# Patient Record
Sex: FEMALE | Race: WHITE | Employment: FULL TIME | URBAN - METROPOLITAN AREA
[De-identification: names, ages, dates, MRNs, and addresses within clinical notes are randomized per-mention and may not be internally consistent; named-entity substitution may affect disease eponyms.]

---

## 2017-02-14 ENCOUNTER — OFFICE VISIT (OUTPATIENT)
Dept: FAMILY MEDICINE CLINIC | Age: 47
End: 2017-02-14

## 2017-02-14 VITALS
RESPIRATION RATE: 16 BRPM | WEIGHT: 266 LBS | HEART RATE: 86 BPM | TEMPERATURE: 97.8 F | HEIGHT: 69 IN | BODY MASS INDEX: 39.4 KG/M2 | SYSTOLIC BLOOD PRESSURE: 138 MMHG | DIASTOLIC BLOOD PRESSURE: 88 MMHG

## 2017-02-14 DIAGNOSIS — H81.11 BPPV (BENIGN PAROXYSMAL POSITIONAL VERTIGO), RIGHT: Primary | ICD-10-CM

## 2017-02-14 DIAGNOSIS — J30.9 ALLERGIC RHINITIS, UNSPECIFIED ALLERGIC RHINITIS TRIGGER, UNSPECIFIED RHINITIS SEASONALITY: ICD-10-CM

## 2017-02-14 PROCEDURE — 99213 OFFICE O/P EST LOW 20 MIN: CPT | Performed by: FAMILY MEDICINE

## 2017-02-14 RX ORDER — MECLIZINE HYDROCHLORIDE 25 MG/1
25 TABLET ORAL 3 TIMES DAILY PRN
Qty: 60 TABLET | Refills: 1 | Status: SHIPPED | OUTPATIENT
Start: 2017-02-14 | End: 2017-10-03 | Stop reason: SDUPTHER

## 2017-02-14 ASSESSMENT — ENCOUNTER SYMPTOMS
CHANGE IN BOWEL HABIT: 0
VOMITING: 0
VISUAL CHANGE: 0
NAUSEA: 0
ABDOMINAL PAIN: 0
EYES NEGATIVE: 1
SWOLLEN GLANDS: 0
RESPIRATORY NEGATIVE: 1
GASTROINTESTINAL NEGATIVE: 1
SORE THROAT: 0
COUGH: 0

## 2017-06-05 DIAGNOSIS — K21.9 GASTROESOPHAGEAL REFLUX DISEASE, ESOPHAGITIS PRESENCE NOT SPECIFIED: ICD-10-CM

## 2017-06-05 DIAGNOSIS — I10 ESSENTIAL HYPERTENSION: ICD-10-CM

## 2017-06-05 RX ORDER — METOPROLOL SUCCINATE 100 MG/1
TABLET, EXTENDED RELEASE ORAL
Qty: 90 TABLET | Refills: 1 | Status: SHIPPED | OUTPATIENT
Start: 2017-06-05 | End: 2017-10-03 | Stop reason: SDUPTHER

## 2017-06-05 RX ORDER — OMEPRAZOLE 40 MG/1
CAPSULE, DELAYED RELEASE ORAL
Qty: 90 CAPSULE | Refills: 1 | Status: SHIPPED | OUTPATIENT
Start: 2017-06-05 | End: 2017-10-03 | Stop reason: SDUPTHER

## 2017-10-03 ENCOUNTER — OFFICE VISIT (OUTPATIENT)
Dept: FAMILY MEDICINE CLINIC | Age: 47
End: 2017-10-03

## 2017-10-03 VITALS
RESPIRATION RATE: 16 BRPM | BODY MASS INDEX: 40.73 KG/M2 | HEART RATE: 78 BPM | WEIGHT: 275 LBS | TEMPERATURE: 98.1 F | SYSTOLIC BLOOD PRESSURE: 136 MMHG | DIASTOLIC BLOOD PRESSURE: 86 MMHG | HEIGHT: 69 IN

## 2017-10-03 DIAGNOSIS — Z12.31 ENCOUNTER FOR SCREENING MAMMOGRAM FOR BREAST CANCER: ICD-10-CM

## 2017-10-03 DIAGNOSIS — I10 ESSENTIAL HYPERTENSION: Primary | ICD-10-CM

## 2017-10-03 DIAGNOSIS — K21.9 GASTROESOPHAGEAL REFLUX DISEASE, ESOPHAGITIS PRESENCE NOT SPECIFIED: ICD-10-CM

## 2017-10-03 DIAGNOSIS — Z83.3 FAMILY HISTORY OF DIABETES MELLITUS (DM): Primary | ICD-10-CM

## 2017-10-03 DIAGNOSIS — I10 ESSENTIAL HYPERTENSION: ICD-10-CM

## 2017-10-03 DIAGNOSIS — H81.11 BPPV (BENIGN PAROXYSMAL POSITIONAL VERTIGO), RIGHT: ICD-10-CM

## 2017-10-03 DIAGNOSIS — Z83.3 FAMILY HISTORY OF DIABETES MELLITUS (DM): ICD-10-CM

## 2017-10-03 DIAGNOSIS — E28.2 PCOD (POLYCYSTIC OVARIAN DISEASE): ICD-10-CM

## 2017-10-03 DIAGNOSIS — F32.81 PMDD (PREMENSTRUAL DYSPHORIC DISORDER): ICD-10-CM

## 2017-10-03 LAB
ALBUMIN SERPL-MCNC: 4.2 G/DL (ref 3.9–4.9)
ALP BLD-CCNC: 79 U/L (ref 40–130)
ALT SERPL-CCNC: 20 U/L (ref 0–33)
ANION GAP SERPL CALCULATED.3IONS-SCNC: 17 MEQ/L (ref 7–13)
AST SERPL-CCNC: 19 U/L (ref 0–35)
BILIRUB SERPL-MCNC: 0.6 MG/DL (ref 0–1.2)
BUN BLDV-MCNC: 18 MG/DL (ref 6–20)
CALCIUM SERPL-MCNC: 9.8 MG/DL (ref 8.6–10.2)
CHLORIDE BLD-SCNC: 105 MEQ/L (ref 98–107)
CHOLESTEROL, TOTAL: 210 MG/DL (ref 0–199)
CO2: 22 MEQ/L (ref 22–29)
CREAT SERPL-MCNC: 0.71 MG/DL (ref 0.5–0.9)
GFR AFRICAN AMERICAN: >60
GFR NON-AFRICAN AMERICAN: >60
GLOBULIN: 2.9 G/DL (ref 2.3–3.5)
GLUCOSE BLD-MCNC: 96 MG/DL (ref 74–109)
HBA1C MFR BLD: 5.5 % (ref 4.8–5.9)
HCT VFR BLD CALC: 43 % (ref 37–47)
HDLC SERPL-MCNC: 57 MG/DL (ref 40–59)
HEMOGLOBIN: 14.1 G/DL (ref 12–16)
LDL CHOLESTEROL CALCULATED: 124 MG/DL (ref 0–129)
MCH RBC QN AUTO: 29.7 PG (ref 27–31.3)
MCHC RBC AUTO-ENTMCNC: 32.7 % (ref 33–37)
MCV RBC AUTO: 90.8 FL (ref 82–100)
PDW BLD-RTO: 14.7 % (ref 11.5–14.5)
PLATELET # BLD: 272 K/UL (ref 130–400)
POTASSIUM SERPL-SCNC: 4.9 MEQ/L (ref 3.5–5.1)
RBC # BLD: 4.73 M/UL (ref 4.2–5.4)
SODIUM BLD-SCNC: 144 MEQ/L (ref 132–144)
TOTAL PROTEIN: 7.1 G/DL (ref 6.4–8.1)
TRIGL SERPL-MCNC: 147 MG/DL (ref 0–200)
WBC # BLD: 9.1 K/UL (ref 4.8–10.8)

## 2017-10-03 PROCEDURE — 99214 OFFICE O/P EST MOD 30 MIN: CPT | Performed by: FAMILY MEDICINE

## 2017-10-03 RX ORDER — IBUPROFEN 800 MG/1
800 TABLET ORAL EVERY 8 HOURS PRN
Qty: 200 TABLET | Refills: 1 | Status: SHIPPED | OUTPATIENT
Start: 2017-10-03 | End: 2018-07-25 | Stop reason: SDUPTHER

## 2017-10-03 RX ORDER — METOPROLOL SUCCINATE 100 MG/1
TABLET, EXTENDED RELEASE ORAL
Qty: 90 TABLET | Refills: 3 | Status: SHIPPED | OUTPATIENT
Start: 2017-10-03 | End: 2018-09-15 | Stop reason: SDUPTHER

## 2017-10-03 RX ORDER — OMEPRAZOLE 40 MG/1
CAPSULE, DELAYED RELEASE ORAL
Qty: 90 CAPSULE | Refills: 3 | Status: SHIPPED | OUTPATIENT
Start: 2017-10-03 | End: 2018-10-15 | Stop reason: SDUPTHER

## 2017-10-03 RX ORDER — MECLIZINE HYDROCHLORIDE 25 MG/1
25 TABLET ORAL 3 TIMES DAILY PRN
Qty: 100 TABLET | Refills: 1 | Status: SHIPPED | OUTPATIENT
Start: 2017-10-03 | End: 2019-09-19 | Stop reason: ALTCHOICE

## 2017-10-03 ASSESSMENT — PATIENT HEALTH QUESTIONNAIRE - PHQ9
2. FEELING DOWN, DEPRESSED OR HOPELESS: 0
SUM OF ALL RESPONSES TO PHQ QUESTIONS 1-9: 0
SUM OF ALL RESPONSES TO PHQ9 QUESTIONS 1 & 2: 0
1. LITTLE INTEREST OR PLEASURE IN DOING THINGS: 0

## 2017-10-03 NOTE — PROGRESS NOTES
Subjective  Adri Gustafson, 48 y.o. female presents today with:  Chief Complaint   Patient presents with    Annual Exam     pt sees gyn for pap    Hypertension       Dad had colon ca dx 77    Past Medical History:   Diagnosis Date    Hypertention, malignant, with acute intensive management     PCOS (polycystic ovarian syndrome)      History reviewed. No pertinent surgical history. Social History     Social History    Marital status:      Spouse name: N/A    Number of children: N/A    Years of education: N/A     Occupational History    Not on file. Social History Main Topics    Smoking status: Never Smoker    Smokeless tobacco: Never Used    Alcohol use Not on file    Drug use: Not on file    Sexual activity: Not on file     Other Topics Concern    Not on file     Social History Narrative     Family History   Problem Relation Age of Onset    Diabetes Paternal Grandfather     Hypertension Father      No Known Allergies  Current Outpatient Prescriptions   Medication Sig Dispense Refill    sertraline (ZOLOFT) 50 MG tablet TAKE 1 TABLET DAILY 90 tablet 3    omeprazole (PRILOSEC) 40 MG delayed release capsule TAKE 1 CAPSULE DAILY 90 capsule 3    metoprolol succinate (TOPROL XL) 100 MG extended release tablet TAKE 1 TABLET DAILY 90 tablet 3    meclizine (ANTIVERT) 25 MG tablet Take 1 tablet by mouth 3 times daily as needed for Dizziness or Nausea 100 tablet 1    ibuprofen (ADVIL;MOTRIN) 800 MG tablet Take 1 tablet by mouth every 8 hours as needed for Pain 200 tablet 1    ketoconazole (NIZORAL) 2 % cream Apply topically 2 times per day 60 g 3    cetirizine (ZYRTEC ALLERGY) 10 MG tablet Take 10 mg by mouth daily      Multiple Vitamins-Minerals (CENTRUM ULTRA WOMENS) TABS Take  by mouth. No current facility-administered medications for this visit. The patient denies any history of      seizures,             heart attack or KNOWN CAD        or stroke.      No chest pain, Classification is high. Expected Values:  Males:    >55 = No Risk            35-55 = Moderate Risk            <35 = High Risk  Females:  >65 = No Risk            45-65 = Moderate Risk            <45 = High Risk  NCEP Guidelines: Third Report May 2001  >59 = negative risk factor for CHD  <40 = major risk factor for CHD      LDL Calculated 06/21/2016 120  0 - 129 mg/dL Final    ATP III LDL Classification is Near Optimal.    T4 Free 06/21/2016 1.23  0.93 - 1.70 ng/dL Final    TSH 06/21/2016 2.570  0.270 - 4.200 uIU/mL Final    Omar Grass IgE 06/21/2016 <0.10  <=0.34 kU/L Final    Comment: Performed by [facility] Steele , [address] [phone]  [email or web address] Tabor MD - Lab. Director      Tam 19 IgE 06/21/2016 <0.10  <=0.34 kU/L Final    Comment: Performed by [facility] Steele , [address] [phone]  www. MD [facility] Lab. Director      ALLERGEN, MAYS PEPPER 06/21/2016 <0.10  <=0.34 kU/L Final    Carrot 06/21/2016 <0.10  <=0.34 kU/L Final    ALLERGEN CHICKEN IGE 06/21/2016 <0.10  <=0.34 kU/L Final    Crab 06/21/2016 <0.10  <=0.34 kU/L Final    Egg White IgE 06/21/2016 0.16  <=0.34 kU/L Final    Grape 06/21/2016 <0.10  <=0.34 kU/L Final    Lettuce 06/21/2016 <0.10  <=0.34 kU/L Final    Milk, Cow's IgE 06/21/2016 <0.10  <=0.34 kU/L Final    Comment: Performed by [facility] Steele , [address] [phone]  wwwMD Ginger Kimble Lab.  Director      ALLERGEN NAVY JACQUES 06/21/2016 <0.10  <=0.34 kU/L Final    Orange 06/21/2016 <0.10  <=0.34 kU/L Final    Peanut IgE 06/21/2016 0.18  <=0.34 kU/L Final    ALLERGEN RYE IGE 06/21/2016 <0.10  <=0.34 kU/L Final    Shrimp IgE 06/21/2016 <0.10  <=0.34 kU/L Final    Soybean IgE 06/21/2016 <0.10  <=0.34 kU/L Final    Tomato IgE 06/21/2016 0.12  <=0.34 kU/L Final    Wheat IgE 06/21/2016 <0.10  <=0.34 kU/L Final    Codfish IgE 06/21/2016 <0.10 <=0.34 kU/L Final    Oat 06/21/2016 <0.10  <=0.34 kU/L Final    ALLERGEN SEE NOTE 06/21/2016 See Note   Final    Comment: REFERENCE INTERVAL: Allergen, Interpretation   Less than 0.10 kU/L. ... Donice Alley Donice Alley Class 0... Donice Alley Donice Alley No significant level detected   0.10-0.34 kU/L. ......... Donice Alley Class 0/1. Donice Alley Donice Alley Clinical relevance undetermined   0.35-0.70 kU/L. ......... Donice Alley Class 1... Donice Alley Donice Alley Low   0.71-3.50 kU/L. ......... Donice Alley Class 2. .. Donice Alley Donice Alley Moderate   3.51-17.50 kU/L. ........ Donice Alley Class 3... Donice Alley Donice Alley High   17.51-50.00 kU/L. ....... Donice Alley Class 4. .. Donice Alley Donice Alley Very High   50..00 kU/L. ...... Donice Alley Class 5. .. Donice Alley Donice Alley Very High   Greater than 100.00kU/L. Donice Alley Class 6. .. Donice Alley Donice Alley Very High  Allergen results of 0.10-0.34 kU/L are intended for specialist use as  the  clinical relevance is undetermined. Even though increasing ranges are  reflective of increasing concentrations of allergen-specific IgE, these  concentrations may not correlate with the degree of clinical response  or skin  testing results when challenged with a specific allergen. The  correlation of  allergy laboratory results with clinical history and in vivo reactivity  to  specific allergens is essential. A negative test may not rule out  clinical                             allergy or even anaphylaxis. Performed by [facility] Steele , [address] [phone]  www. Valentino Phillips MD - Lab.  Director      ALLERGEN POTATO IGE 06/21/2016 <0.10  <=0.34 kU/L Final    ALLERGEN RICE IGE 06/21/2016 <0.10  <=0.34 kU/L Final    Barley 06/21/2016 <0.10  <=0.34 kU/L Final    Allergen Tuna IgE 06/21/2016 <0.10  <=0.34 kU/L Final    Corn 06/21/2016 <0.10  <=0.34 kU/L Final    Cabbage 06/21/2016 <0.10  <=0.34 kU/L Final    Beef 06/21/2016 <0.10  <=0.34 kU/L Final    Allergen, Pork 06/21/2016 <0.10  <=0.34 kU/L Final    IgE 06/21/2016 18  <=214 kU/L Final    Comment: REFERENCE INTERVAL: Immunoglobulin E, Serum  Access complete set of age- and/or gender-specific reference intervals  for  this test in the [address] [facility] Test omeprazole (PRILOSEC) 40 MG delayed release capsule Reorder    metoprolol succinate (TOPROL XL) 100 MG extended release tablet Reorder    meclizine (ANTIVERT) 25 MG tablet Reorder    ibuprofen (ADVIL;MOTRIN) 800 MG tablet Reorder     No Follow-up on file. lmp 1 wk ago WNL  Pinky Hinton is advised to follow up ASAP if condition deteriorates or problems arise and if no information on test results to patient in the next 1 month they are advised to call us.    Mammogram ordered  Mood is good    Britney Aguilera MD

## 2017-10-03 NOTE — MR AVS SNAPSHOT
people who are more muscular. Even a small weight loss (between 5 and 10 percent of your current weight) by decreasing your calorie intake and becoming more physically active will help lower your risk of developing or worsening diseases associated with obesity. Learn more at: [employer]             Today's Medication Changes          These changes are accurate as of: 10/3/17  9:57 AM.  If you have any questions, ask your nurse or doctor. CHANGE how you take these medications           metoprolol succinate 100 MG extended release tablet   Commonly known as:  TOPROL XL   Instructions:  TAKE 1 TABLET DAILY   Quantity:  90 tablet   Refills:  3   What changed:  See the new instructions. Changed by:  Bernarda Ramirez MD       omeprazole 40 MG delayed release capsule   Commonly known as:  PRILOSEC   Instructions:  TAKE 1 CAPSULE DAILY   Quantity:  90 capsule   Refills:  3   What changed:  See the new instructions. Changed by:  Bernarda Ramirez MD       sertraline 50 MG tablet   Commonly known as:  ZOLOFT   Instructions:  TAKE 1 TABLET DAILY   Quantity:  90 tablet   Refills:  3   What changed:  See the new instructions.    Changed by:  Bernarda Ramirez MD         STOP taking these medications           albuterol sulfate  (90 Base) MCG/ACT inhaler   Commonly known as:  PROVENTIL HFA   Stopped by:  Bernarda Ramirez MD       montelukast 10 MG tablet   Commonly known as:  SINGULAIR   Stopped by:  Bernarda Ramirez MD            Where to Get Your Medications      These medications were sent to [facility], [address] [phone] [facility] [place] [phone]  [address] [address]     Phone:  [phone]     ibuprofen 800 MG tablet    meclizine 25 MG tablet    metoprolol succinate 100 MG extended release tablet    omeprazole 40 MG delayed release capsule    sertraline 50 MG tablet view the After Visit Summary. Additional Information  If you have questions, please contact the physician practice where you receive care. Remember, MyChart is NOT to be used for urgent needs. For medical emergencies, dial 911. For questions regarding your [facility] account call [phone]. If you have a clinical question, please call your doctor's office.

## 2017-12-06 ENCOUNTER — HOSPITAL ENCOUNTER (OUTPATIENT)
Dept: WOMENS IMAGING | Age: 47
Discharge: HOME | End: 2017-12-06
Payer: COMMERCIAL

## 2017-12-06 DIAGNOSIS — Z12.31 ENCOUNTER FOR SCREENING MAMMOGRAM FOR BREAST CANCER: ICD-10-CM

## 2017-12-06 PROCEDURE — 77063 BREAST TOMOSYNTHESIS BI: CPT

## 2018-07-25 DIAGNOSIS — F32.81 PMDD (PREMENSTRUAL DYSPHORIC DISORDER): ICD-10-CM

## 2018-07-25 RX ORDER — IBUPROFEN 800 MG/1
800 TABLET ORAL EVERY 8 HOURS PRN
Qty: 200 TABLET | Refills: 0 | Status: SHIPPED | OUTPATIENT
Start: 2018-07-25 | End: 2018-10-15 | Stop reason: SDUPTHER

## 2018-09-15 DIAGNOSIS — I10 ESSENTIAL HYPERTENSION: ICD-10-CM

## 2018-09-17 RX ORDER — METOPROLOL SUCCINATE 100 MG/1
TABLET, EXTENDED RELEASE ORAL
Qty: 90 TABLET | Refills: 1 | Status: SHIPPED | OUTPATIENT
Start: 2018-09-17 | End: 2018-10-15 | Stop reason: SDUPTHER

## 2018-09-22 ENCOUNTER — OFFICE VISIT (OUTPATIENT)
Dept: FAMILY MEDICINE CLINIC | Age: 48
End: 2018-09-22
Payer: COMMERCIAL

## 2018-09-22 VITALS
SYSTOLIC BLOOD PRESSURE: 126 MMHG | WEIGHT: 252 LBS | OXYGEN SATURATION: 97 % | TEMPERATURE: 97.1 F | HEART RATE: 94 BPM | DIASTOLIC BLOOD PRESSURE: 82 MMHG | HEIGHT: 69 IN | BODY MASS INDEX: 37.33 KG/M2

## 2018-09-22 DIAGNOSIS — R35.0 FREQUENCY OF URINATION: Primary | ICD-10-CM

## 2018-09-22 DIAGNOSIS — R35.0 FREQUENCY OF URINATION: ICD-10-CM

## 2018-09-22 LAB
BILIRUBIN, POC: ABNORMAL
BLOOD URINE, POC: ABNORMAL
CLARITY, POC: CLEAR
COLOR, POC: YELLOW
GLUCOSE URINE, POC: ABNORMAL
KETONES, POC: ABNORMAL
LEUKOCYTE EST, POC: ABNORMAL
NITRITE, POC: ABNORMAL
PH, POC: 5.5
PROTEIN, POC: ABNORMAL
SPECIFIC GRAVITY, POC: 1.03
UROBILINOGEN, POC: ABNORMAL

## 2018-09-22 PROCEDURE — 81003 URINALYSIS AUTO W/O SCOPE: CPT | Performed by: NURSE PRACTITIONER

## 2018-09-22 PROCEDURE — 99213 OFFICE O/P EST LOW 20 MIN: CPT | Performed by: NURSE PRACTITIONER

## 2018-09-22 RX ORDER — NITROFURANTOIN 25; 75 MG/1; MG/1
100 CAPSULE ORAL 2 TIMES DAILY
Qty: 6 CAPSULE | Refills: 0 | Status: SHIPPED | OUTPATIENT
Start: 2018-09-22 | End: 2018-09-25

## 2018-09-22 RX ORDER — PHENAZOPYRIDINE HYDROCHLORIDE 100 MG/1
100 TABLET, FILM COATED ORAL 3 TIMES DAILY PRN
Qty: 6 TABLET | Refills: 0 | Status: SHIPPED | OUTPATIENT
Start: 2018-09-22 | End: 2018-09-24

## 2018-09-22 ASSESSMENT — ENCOUNTER SYMPTOMS
EYE REDNESS: 0
ABDOMINAL DISTENTION: 0
WHEEZING: 0
SORE THROAT: 0
CONSTIPATION: 0
FACIAL SWELLING: 0
DIARRHEA: 0
SHORTNESS OF BREATH: 0
EYE DISCHARGE: 0
ABDOMINAL PAIN: 0
BACK PAIN: 0
CHEST TIGHTNESS: 0
NAUSEA: 0
SINUS PRESSURE: 0
COUGH: 0

## 2018-09-22 NOTE — PROGRESS NOTES
Subjective:      Patient ID: Winsome Zhou is a 50 y.o. female who presents today for:  Chief Complaint   Patient presents with    Urinary Tract Infection     pt states she been having uti symptoms since Monday. She is having burning when urinating and pain in her lower back. HPI    Past Medical History:   Diagnosis Date    Hypertention, malignant, with acute intensive management     PCOS (polycystic ovarian syndrome)      No past surgical history on file. Family History   Problem Relation Age of Onset    Diabetes Paternal Grandfather     Hypertension Father      Social History     Social History    Marital status:      Spouse name: N/A    Number of children: N/A    Years of education: N/A     Occupational History    Not on file. Social History Main Topics    Smoking status: Never Smoker    Smokeless tobacco: Never Used    Alcohol use Not on file    Drug use: Unknown    Sexual activity: Not on file     Other Topics Concern    Not on file     Social History Narrative    No narrative on file     Current Outpatient Prescriptions on File Prior to Visit   Medication Sig Dispense Refill    metoprolol succinate (TOPROL XL) 100 MG extended release tablet TAKE 1 TABLET DAILY 90 tablet 1    ibuprofen (ADVIL;MOTRIN) 800 MG tablet Take 1 tablet by mouth every 8 hours as needed for Pain 200 tablet 0    sertraline (ZOLOFT) 50 MG tablet TAKE 1 TABLET DAILY 90 tablet 3    omeprazole (PRILOSEC) 40 MG delayed release capsule TAKE 1 CAPSULE DAILY 90 capsule 3    meclizine (ANTIVERT) 25 MG tablet Take 1 tablet by mouth 3 times daily as needed for Dizziness or Nausea 100 tablet 1    ketoconazole (NIZORAL) 2 % cream Apply topically 2 times per day 60 g 3    cetirizine (ZYRTEC ALLERGY) 10 MG tablet Take 10 mg by mouth daily      Multiple Vitamins-Minerals (CENTRUM ULTRA WOMENS) TABS Take  by mouth. No current facility-administered medications on file prior to visit.         Allergies: Patient has no known allergies. Review of Systems   Constitutional: Negative for appetite change, chills, diaphoresis, fatigue and fever. HENT: Negative for congestion, dental problem, ear discharge, ear pain, facial swelling, mouth sores, postnasal drip, sinus pressure and sore throat. Eyes: Negative for discharge and redness. Respiratory: Negative for cough, chest tightness, shortness of breath and wheezing. Cardiovascular: Negative for chest pain, palpitations and leg swelling. Gastrointestinal: Negative for abdominal distention, abdominal pain, constipation, diarrhea and nausea. Endocrine: Negative for cold intolerance and heat intolerance. Genitourinary: Positive for dysuria and frequency. Negative for difficulty urinating, flank pain, pelvic pain and urgency. Musculoskeletal: Negative for arthralgias, back pain, gait problem and joint swelling. Skin: Negative. Neurological: Negative for dizziness, seizures, syncope, weakness, numbness and headaches. Psychiatric/Behavioral: Negative for agitation, behavioral problems, confusion and dysphoric mood. Objective:   /82   Pulse 94   Temp 97.1 °F (36.2 °C) (Tympanic)   Ht 5' 9\" (1.753 m)   Wt 252 lb (114.3 kg)   SpO2 97%   BMI 37.21 kg/m²     Physical Exam   Constitutional: She is oriented to person, place, and time. She appears well-developed. HENT:   Head: Normocephalic and atraumatic. Mouth/Throat: Oropharynx is clear and moist.   Eyes: Pupils are equal, round, and reactive to light. Neck: No tracheal deviation present. No thyromegaly present. Cardiovascular: Normal rate, regular rhythm and normal heart sounds. Exam reveals no gallop. No murmur heard. Pulmonary/Chest: Effort normal and breath sounds normal. She has no wheezes. She has no rales. She exhibits no tenderness. Abdominal: Soft. Bowel sounds are normal. She exhibits no distension and no mass. There is no tenderness.  There is no rebound and no

## 2018-09-24 LAB
ORGANISM: ABNORMAL
URINE CULTURE, ROUTINE: ABNORMAL
URINE CULTURE, ROUTINE: ABNORMAL

## 2018-09-25 ENCOUNTER — TELEPHONE (OUTPATIENT)
Dept: FAMILY MEDICINE CLINIC | Age: 48
End: 2018-09-25

## 2018-09-25 NOTE — TELEPHONE ENCOUNTER
Pt would like call as to Urine culture results when reviewed by NADEEM. States she only was given 3 days of antibiotics until culture results were in.

## 2018-09-26 RX ORDER — CEPHALEXIN 500 MG/1
500 CAPSULE ORAL 3 TIMES DAILY
Qty: 21 CAPSULE | Refills: 0 | Status: SHIPPED | OUTPATIENT
Start: 2018-09-26 | End: 2018-10-15 | Stop reason: ALTCHOICE

## 2018-10-15 ENCOUNTER — OFFICE VISIT (OUTPATIENT)
Dept: FAMILY MEDICINE CLINIC | Age: 48
End: 2018-10-15
Payer: COMMERCIAL

## 2018-10-15 VITALS
WEIGHT: 264 LBS | TEMPERATURE: 97.7 F | BODY MASS INDEX: 39.1 KG/M2 | RESPIRATION RATE: 16 BRPM | HEART RATE: 76 BPM | DIASTOLIC BLOOD PRESSURE: 82 MMHG | HEIGHT: 69 IN | SYSTOLIC BLOOD PRESSURE: 136 MMHG

## 2018-10-15 DIAGNOSIS — I10 ESSENTIAL HYPERTENSION: Primary | ICD-10-CM

## 2018-10-15 DIAGNOSIS — K21.9 GASTROESOPHAGEAL REFLUX DISEASE, ESOPHAGITIS PRESENCE NOT SPECIFIED: ICD-10-CM

## 2018-10-15 DIAGNOSIS — E28.2 PCOD (POLYCYSTIC OVARIAN DISEASE): ICD-10-CM

## 2018-10-15 DIAGNOSIS — Z23 NEED FOR INFLUENZA VACCINATION: ICD-10-CM

## 2018-10-15 DIAGNOSIS — Z12.11 COLON CANCER SCREENING: ICD-10-CM

## 2018-10-15 DIAGNOSIS — Z12.31 VISIT FOR SCREENING MAMMOGRAM: ICD-10-CM

## 2018-10-15 DIAGNOSIS — F32.81 PMDD (PREMENSTRUAL DYSPHORIC DISORDER): ICD-10-CM

## 2018-10-15 PROCEDURE — 90471 IMMUNIZATION ADMIN: CPT | Performed by: FAMILY MEDICINE

## 2018-10-15 PROCEDURE — 99214 OFFICE O/P EST MOD 30 MIN: CPT | Performed by: FAMILY MEDICINE

## 2018-10-15 PROCEDURE — 90688 IIV4 VACCINE SPLT 0.5 ML IM: CPT | Performed by: FAMILY MEDICINE

## 2018-10-15 RX ORDER — METOPROLOL SUCCINATE 100 MG/1
TABLET, EXTENDED RELEASE ORAL
Qty: 135 TABLET | Refills: 3 | Status: SHIPPED | OUTPATIENT
Start: 2018-10-15 | End: 2019-09-19 | Stop reason: SDUPTHER

## 2018-10-15 RX ORDER — OMEPRAZOLE 40 MG/1
CAPSULE, DELAYED RELEASE ORAL
Qty: 90 CAPSULE | Refills: 3 | Status: SHIPPED | OUTPATIENT
Start: 2018-10-15 | End: 2019-09-19 | Stop reason: SDUPTHER

## 2018-10-15 RX ORDER — IBUPROFEN 800 MG/1
800 TABLET ORAL EVERY 8 HOURS PRN
Qty: 200 TABLET | Refills: 3 | Status: SHIPPED | OUTPATIENT
Start: 2018-10-15 | End: 2019-09-19 | Stop reason: SDUPTHER

## 2018-10-15 ASSESSMENT — PATIENT HEALTH QUESTIONNAIRE - PHQ9
SUM OF ALL RESPONSES TO PHQ9 QUESTIONS 1 & 2: 0
2. FEELING DOWN, DEPRESSED OR HOPELESS: 0
SUM OF ALL RESPONSES TO PHQ QUESTIONS 1-9: 0
SUM OF ALL RESPONSES TO PHQ QUESTIONS 1-9: 0
1. LITTLE INTEREST OR PLEASURE IN DOING THINGS: 0

## 2018-10-15 NOTE — PROGRESS NOTES
Vaccine Information Sheet, \"Influenza - Inactivated\"  given to Blanca Sanchez, or parent/legal guardian of  Blanca Sanchez and verbalized understanding. Patient responses:    Have you ever had a reaction to a flu vaccine? No  Are you able to eat eggs without adverse effects? Yes  Do you have any current illness? No  Have you ever had Guillian [place] Syndrome? No    Flu vaccine given per order. Please see immunization tab.
09/22/18 94   10/03/17 78   02/14/17 86       PHYSICAL EXAMINATION:        GENERAL:    The patient appears well nourished and well-developed,     Normal affect. Not appearing significantly anxious or depressed. No acute respiratory distress. Alert and oriented times 3. Skin:     No skin rashes. No concerning moles observed. Gait:    Normal gait. No ataxia. HEENT:  Normocephalic, atraumatic. Throat:  Pharynx is clear, no erythema/ edema or exudates   Ears:    TMs normal bilaterally. Canals and ears normal   Eyes:  Extraocular eye motions intact and pain free. Pupils reactive/equal    Sclerae and conjunctivae clear    NECK: No masses or adenopathy palpable. No carotid bruits heard. No asymmetry visible. No thyromegaly. RESPIRATORY:   Clear/ Equal breath sounds /No acute respiratory distress. No wheezes,rales, or rhonchi. No percussive abnormalities    HEART: Regular rhythm without murmur, rub or gallop. ABDOMEN:  Obese Soft, non tender. No masses, guarding or rebound. Normo active bowel sounds. EXTREMITIES:  No edema in any extremity. No cyanosis or clubbing. 2+ dorsalis pedis pulses bilaterally          Assessment & Plan    Diagnosis Orders   1. Essential hypertension  metoprolol succinate (TOPROL XL) 100 MG extended release tablet    Lipid Panel    CBC    Comprehensive Metabolic Panel   2. PCOD (polycystic ovarian disease)     3. PMDD (premenstrual dysphoric disorder)  ibuprofen (ADVIL;MOTRIN) 800 MG tablet    sertraline (ZOLOFT) 50 MG tablet   4. Gastroesophageal reflux disease, esophagitis presence not specified  omeprazole (PRILOSEC) 40 MG delayed release capsule   5. Need for influenza vaccination  INFLUENZA, QUADV, 3 YRS AND OLDER, IM, MDV, 0.5ML ([facility])   6. Colon cancer screening  [facility]ia Otoniel Hussein MD - [facility]   7.  Visit for screening mammogram  [facility] DIGITAL SCREEN W OR WO CAD BILATERAL     Orders Placed

## 2018-11-02 ENCOUNTER — OFFICE VISIT (OUTPATIENT)
Dept: OBGYN CLINIC | Age: 48
End: 2018-11-02
Payer: COMMERCIAL

## 2018-11-02 VITALS
BODY MASS INDEX: 38.36 KG/M2 | WEIGHT: 259 LBS | HEIGHT: 69 IN | SYSTOLIC BLOOD PRESSURE: 132 MMHG | DIASTOLIC BLOOD PRESSURE: 82 MMHG

## 2018-11-02 DIAGNOSIS — I10 ESSENTIAL HYPERTENSION: ICD-10-CM

## 2018-11-02 DIAGNOSIS — Z11.51 SCREENING FOR HUMAN PAPILLOMAVIRUS: ICD-10-CM

## 2018-11-02 DIAGNOSIS — Z01.419 PAP SMEAR, AS PART OF ROUTINE GYNECOLOGICAL EXAMINATION: Primary | ICD-10-CM

## 2018-11-02 DIAGNOSIS — N92.6 IRREGULAR MENSES: ICD-10-CM

## 2018-11-02 LAB
ALBUMIN SERPL-MCNC: 4.4 G/DL (ref 3.9–4.9)
ALP BLD-CCNC: 79 U/L (ref 40–130)
ALT SERPL-CCNC: 16 U/L (ref 0–33)
ANION GAP SERPL CALCULATED.3IONS-SCNC: 19 MEQ/L (ref 7–13)
AST SERPL-CCNC: 20 U/L (ref 0–35)
BILIRUB SERPL-MCNC: 0.8 MG/DL (ref 0–1.2)
BUN BLDV-MCNC: 12 MG/DL (ref 6–20)
CALCIUM SERPL-MCNC: 9.5 MG/DL (ref 8.6–10.2)
CHLORIDE BLD-SCNC: 98 MEQ/L (ref 98–107)
CHOLESTEROL, TOTAL: 202 MG/DL (ref 0–199)
CO2: 21 MEQ/L (ref 22–29)
CREAT SERPL-MCNC: 0.91 MG/DL (ref 0.5–0.9)
GFR AFRICAN AMERICAN: >60
GFR NON-AFRICAN AMERICAN: >60
GLOBULIN: 3 G/DL (ref 2.3–3.5)
GLUCOSE BLD-MCNC: 89 MG/DL (ref 74–109)
HCT VFR BLD CALC: 44.4 % (ref 37–47)
HDLC SERPL-MCNC: 57 MG/DL (ref 40–59)
HEMOGLOBIN: 14.9 G/DL (ref 12–16)
LDL CHOLESTEROL CALCULATED: 115 MG/DL (ref 0–129)
MCH RBC QN AUTO: 30.4 PG (ref 27–31.3)
MCHC RBC AUTO-ENTMCNC: 33.6 % (ref 33–37)
MCV RBC AUTO: 90.5 FL (ref 82–100)
PDW BLD-RTO: 14.6 % (ref 11.5–14.5)
PLATELET # BLD: 278 K/UL (ref 130–400)
POTASSIUM SERPL-SCNC: 4.5 MEQ/L (ref 3.5–5.1)
RBC # BLD: 4.9 M/UL (ref 4.2–5.4)
SODIUM BLD-SCNC: 138 MEQ/L (ref 132–144)
TOTAL PROTEIN: 7.4 G/DL (ref 6.4–8.1)
TRIGL SERPL-MCNC: 148 MG/DL (ref 0–200)
WBC # BLD: 8.3 K/UL (ref 4.8–10.8)

## 2018-11-02 PROCEDURE — 99386 PREV VISIT NEW AGE 40-64: CPT | Performed by: OBSTETRICS & GYNECOLOGY

## 2018-11-02 ASSESSMENT — ENCOUNTER SYMPTOMS
ABDOMINAL DISTENTION: 0
VOMITING: 0
DIARRHEA: 0
CONSTIPATION: 0
RECTAL PAIN: 0
ABDOMINAL PAIN: 0
NAUSEA: 0
ALLERGIC/IMMUNOLOGIC NEGATIVE: 1
ANAL BLEEDING: 0
BLOOD IN STOOL: 0
EYES NEGATIVE: 1
RESPIRATORY NEGATIVE: 1

## 2018-11-02 NOTE — PROGRESS NOTES
Number of children: N/A    Years of education: N/A     Occupational History    Not on file. Social History Main Topics    Smoking status: Never Smoker    Smokeless tobacco: Never Used    Alcohol use Yes      Comment: occ    Drug use: No    Sexual activity: Not Currently     Other Topics Concern    Not on file     Social History Narrative    No narrative on file     Family History   Problem Relation Age of Onset    Diabetes Paternal Grandfather     Hypertension Father     Cancer Father         colon    Cancer Brother         kidney    No Known Problems Paternal Grandmother     No Known Problems Maternal Grandmother     No Known Problems Maternal Grandfather     No Known Problems Mother     No Known Problems Sister     No Known Problems Other     Breast Cancer Neg Hx     Colon Cancer Neg Hx     Eclampsia Neg Hx     Ovarian Cancer Neg Hx      Labor Neg Hx     Spont Abortions Neg Hx     Stroke Neg Hx        Review of Systems   Constitutional: Negative. Negative for activity change, appetite change, chills, diaphoresis, fatigue, fever and unexpected weight change. HENT: Negative. Eyes: Negative. Respiratory: Negative. Cardiovascular: Negative. Gastrointestinal: Negative for abdominal distention, abdominal pain, anal bleeding, blood in stool, constipation, diarrhea, nausea, rectal pain and vomiting. Endocrine: Negative. Genitourinary: Positive for menstrual problem (Irregular cycles). Negative for decreased urine volume, difficulty urinating, dyspareunia, dysuria, enuresis, flank pain, frequency, genital sores, hematuria, pelvic pain, urgency, vaginal bleeding, vaginal discharge and vaginal pain. Musculoskeletal: Negative. Skin: Negative. Allergic/Immunologic: Negative. Neurological: Negative. Hematological: Negative. Psychiatric/Behavioral: Negative. Objective:     Physical Exam   Constitutional: She is oriented to person, place, and time. Plan:      Medications placedthis encounter:  No orders of the defined types were placed in this encounter. Orders placedthis encounter:  Orders Placed This Encounter   Procedures    PAP SMEAR     Patient History:    Patient's last menstrual period was 10/16/2018. OBGYN Status: Having periods  No past surgical history on file. Smoking status: Never Smoker                                                              Smokeless tobacco: Never Used                           Standing Status:   Future     Standing Expiration Date:   11/2/2019     Order Specific Question:   Collection Type     Answer: Thin Prep     Order Specific Question:   Prior Abnormal Pap Test     Answer:   No     Order Specific Question:   Screening or Diagnostic     Answer:   Screening     Order Specific Question:   HPV Requested? Answer:   HPV Typing with HPV 16/18     Order Specific Question:   High Risk Patient     Answer:   N/A         Follow up:   Annual exam.

## 2018-11-08 DIAGNOSIS — Z11.51 SCREENING FOR HUMAN PAPILLOMAVIRUS: ICD-10-CM

## 2018-11-08 DIAGNOSIS — Z01.419 PAP SMEAR, AS PART OF ROUTINE GYNECOLOGICAL EXAMINATION: ICD-10-CM

## 2019-04-24 ENCOUNTER — TELEPHONE (OUTPATIENT)
Dept: INTERNAL MEDICINE CLINIC | Age: 49
End: 2019-04-24

## 2019-08-21 ENCOUNTER — OFFICE VISIT (OUTPATIENT)
Dept: OBGYN CLINIC | Age: 49
End: 2019-08-21
Payer: COMMERCIAL

## 2019-08-21 VITALS
DIASTOLIC BLOOD PRESSURE: 82 MMHG | WEIGHT: 267 LBS | SYSTOLIC BLOOD PRESSURE: 126 MMHG | BODY MASS INDEX: 39.55 KG/M2 | HEIGHT: 69 IN

## 2019-08-21 DIAGNOSIS — N89.8 VAGINAL DISCHARGE: Primary | ICD-10-CM

## 2019-08-21 DIAGNOSIS — R10.32 LLQ ABDOMINAL PAIN: ICD-10-CM

## 2019-08-21 DIAGNOSIS — Z12.31 SCREENING MAMMOGRAM, ENCOUNTER FOR: ICD-10-CM

## 2019-08-21 LAB
BILIRUBIN, POC: NORMAL
BLOOD URINE, POC: NORMAL
CLARITY, POC: NORMAL
COLOR, POC: NORMAL
GLUCOSE URINE, POC: NORMAL
KETONES, POC: NORMAL
LEUKOCYTE EST, POC: NORMAL
NITRITE, POC: NORMAL
PH, POC: 6
PROTEIN, POC: NORMAL
SPECIFIC GRAVITY, POC: 1.03
UROBILINOGEN, POC: NORMAL

## 2019-08-21 PROCEDURE — 99213 OFFICE O/P EST LOW 20 MIN: CPT | Performed by: OBSTETRICS & GYNECOLOGY

## 2019-08-21 PROCEDURE — 81002 URINALYSIS NONAUTO W/O SCOPE: CPT | Performed by: OBSTETRICS & GYNECOLOGY

## 2019-08-21 ASSESSMENT — ENCOUNTER SYMPTOMS
BLOOD IN STOOL: 0
ANAL BLEEDING: 0
RESPIRATORY NEGATIVE: 1
ABDOMINAL PAIN: 0
DIARRHEA: 0
ABDOMINAL DISTENTION: 0
CONSTIPATION: 0
RECTAL PAIN: 0
EYES NEGATIVE: 1
VOMITING: 0
ALLERGIC/IMMUNOLOGIC NEGATIVE: 1
NAUSEA: 0

## 2019-08-21 NOTE — PROGRESS NOTES
Patient here c/o vaginal discharge and LLQ pain x 2 weeks. States discharge is more copious, but does not itch, burn or smell foul. No new sexual partner. Cycles q 21-22 days lasting 7 days. No IM or PC bleeding. States she did start swimming in last month. Denies soap or detergent change. Denies dysuria, urgency or frequency,  Denies diarrhea or constipation. SSE with normal appearing discharge; cx done for sx. Ordered pelvic US. F/U results. Vitals:  /82   Ht 5' 9\" (1.753 m)   Wt 267 lb (121.1 kg)   LMP 08/04/2019   BMI 39.43 kg/m²   Past Medical History:   Diagnosis Date    Hypertention, malignant, with acute intensive management     PCOS (polycystic ovarian syndrome)      Past Surgical History:   Procedure Laterality Date    DILATION AND CURETTAGE      HYSTEROSCOPY       Allergies:  Patient has no known allergies. Current Outpatient Medications   Medication Sig Dispense Refill    metoprolol succinate (TOPROL XL) 100 MG extended release tablet TAKE 1 1/2 TABLET DAILY 135 tablet 3    ibuprofen (ADVIL;MOTRIN) 800 MG tablet Take 1 tablet by mouth every 8 hours as needed for Pain 200 tablet 3    sertraline (ZOLOFT) 50 MG tablet TAKE 1 TABLET DAILY 90 tablet 3    omeprazole (PRILOSEC) 40 MG delayed release capsule TAKE 1 CAPSULE DAILY 90 capsule 3    meclizine (ANTIVERT) 25 MG tablet Take 1 tablet by mouth 3 times daily as needed for Dizziness or Nausea 100 tablet 1    ketoconazole (NIZORAL) 2 % cream Apply topically 2 times per day 60 g 3    cetirizine (ZYRTEC ALLERGY) 10 MG tablet Take 10 mg by mouth daily      Multiple Vitamins-Minerals (CENTRUM ULTRA WOMENS) TABS Take  by mouth. No current facility-administered medications for this visit.       Social History     Socioeconomic History    Marital status:      Spouse name: Not on file    Number of children: Not on file    Years of education: Not on file    Highest education level: Not on file   Occupational affect. Her behavior is normal. Judgment and thought content normal.       Assessment:          Diagnosis Orders   1. Vaginal discharge  Miscellaneous sendout 3   2. LLQ abdominal pain  Miscellaneous sendout 3    US Pelvis Complete    US Non OB Transvaginal   3. Screening mammogram, encounter for  [facility] DIGITAL SCREEN W CAD BILATERAL        Plan:      Medications placedthis encounter:  No orders of the defined types were placed in this encounter. Orders placedthis encounter:  Orders Placed This Encounter   Procedures    US Pelvis Complete     Standing Status:   Future     Standing Expiration Date:   8/20/2020    US Non OB Transvaginal     Standing Status:   Future     Standing Expiration Date:   8/21/2020    BLAYNE DIGITAL SCREEN W CAD BILATERAL     Standing Status:   Future     Standing Expiration Date:   8/20/2020    Miscellaneous sendout 3     Standing Status:   Future     Standing Expiration Date:   8/20/2020     Order Specific Question:   Specify Req. Test (1 Test/Order)     Answer:   culture         Follow up:  Return for Ultrasound, Results Review.

## 2019-08-23 ENCOUNTER — HOSPITAL ENCOUNTER (OUTPATIENT)
Dept: ULTRASOUND IMAGING | Age: 49
Discharge: HOME | End: 2019-08-25
Payer: COMMERCIAL

## 2019-08-23 DIAGNOSIS — R10.32 LLQ ABDOMINAL PAIN: ICD-10-CM

## 2019-08-23 PROCEDURE — 76830 TRANSVAGINAL US NON-OB: CPT

## 2019-08-23 PROCEDURE — 76856 US EXAM PELVIC COMPLETE: CPT

## 2019-08-28 DIAGNOSIS — R10.32 LLQ ABDOMINAL PAIN: ICD-10-CM

## 2019-08-28 DIAGNOSIS — N89.8 VAGINAL DISCHARGE: ICD-10-CM

## 2019-08-29 ENCOUNTER — TELEPHONE (OUTPATIENT)
Dept: OBGYN CLINIC | Age: 49
End: 2019-08-29

## 2019-08-30 RX ORDER — TINIDAZOLE 500 MG/1
2 TABLET ORAL ONCE
Qty: 4 TABLET | Refills: 0 | Status: SHIPPED | OUTPATIENT
Start: 2019-08-30 | End: 2019-08-30

## 2019-09-10 ENCOUNTER — OFFICE VISIT (OUTPATIENT)
Dept: OBGYN CLINIC | Age: 49
End: 2019-09-10
Payer: COMMERCIAL

## 2019-09-10 VITALS
BODY MASS INDEX: 38.95 KG/M2 | SYSTOLIC BLOOD PRESSURE: 134 MMHG | HEIGHT: 69 IN | DIASTOLIC BLOOD PRESSURE: 88 MMHG | WEIGHT: 263 LBS

## 2019-09-10 DIAGNOSIS — R10.32 LLQ ABDOMINAL PAIN: Primary | ICD-10-CM

## 2019-09-10 DIAGNOSIS — N83.291 COMPLEX CYST OF RIGHT OVARY: ICD-10-CM

## 2019-09-10 DIAGNOSIS — R93.89 ENDOMETRIAL THICKENING ON ULTRASOUND: ICD-10-CM

## 2019-09-10 LAB — CA 125: 91.1 U/ML (ref 0–35)

## 2019-09-10 PROCEDURE — 99213 OFFICE O/P EST LOW 20 MIN: CPT | Performed by: OBSTETRICS & GYNECOLOGY

## 2019-09-10 ASSESSMENT — ENCOUNTER SYMPTOMS
NAUSEA: 0
CONSTIPATION: 0
ALLERGIC/IMMUNOLOGIC NEGATIVE: 1
RESPIRATORY NEGATIVE: 1
ANAL BLEEDING: 0
ABDOMINAL PAIN: 0
EYES NEGATIVE: 1
ABDOMINAL DISTENTION: 0
VOMITING: 0
DIARRHEA: 0
RECTAL PAIN: 0
BLOOD IN STOOL: 0

## 2019-09-10 NOTE — PROGRESS NOTES
Patient here to discuss US result ordered for LLQ pain. US with 10 cm uterus with thickened endometrium of 19mm. RIGHT complex 4 cm ovarian cyst.  Ordered  and repeat US in 3 months. LEFT ovary WNL. Patient did have BV and was treated. If LLQ persists recommended F/U for routine colonoscopy and appt with PCP to further evaluate. Previous urine negative. Will return for EMB and also F/U US in 3 months. All questions answered. F/U as directed. Pt was seen with total face to face time of 15 minutes with more than 50% of the visit being counseling and education regarding encounter dx of ovarian cyst and thickened endometrium. See discussion /counseling details as stated above. Vitals:  /88   Ht 5' 9\" (1.753 m)   Wt 263 lb (119.3 kg)   LMP 08/04/2019   BMI 38.84 kg/m²   Past Medical History:   Diagnosis Date    Hypertention, malignant, with acute intensive management     PCOS (polycystic ovarian syndrome)      Past Surgical History:   Procedure Laterality Date    DILATION AND CURETTAGE      HYSTEROSCOPY       Allergies:  Patient has no known allergies. Current Outpatient Medications   Medication Sig Dispense Refill    metoprolol succinate (TOPROL XL) 100 MG extended release tablet TAKE 1 1/2 TABLET DAILY 135 tablet 3    ibuprofen (ADVIL;MOTRIN) 800 MG tablet Take 1 tablet by mouth every 8 hours as needed for Pain 200 tablet 3    sertraline (ZOLOFT) 50 MG tablet TAKE 1 TABLET DAILY 90 tablet 3    omeprazole (PRILOSEC) 40 MG delayed release capsule TAKE 1 CAPSULE DAILY 90 capsule 3    meclizine (ANTIVERT) 25 MG tablet Take 1 tablet by mouth 3 times daily as needed for Dizziness or Nausea 100 tablet 1    ketoconazole (NIZORAL) 2 % cream Apply topically 2 times per day 60 g 3    cetirizine (ZYRTEC ALLERGY) 10 MG tablet Take 10 mg by mouth daily      Multiple Vitamins-Minerals (CENTRUM ULTRA WOMENS) TABS Take  by mouth.          No current facility-administered medications for this visit.       Social History     Socioeconomic History    Marital status:      Spouse name: Not on file    Number of children: Not on file    Years of education: Not on file    Highest education level: Not on file   Occupational History    Not on file   Social Needs    Financial resource strain: Not on file    Food insecurity:     Worry: Not on file     Inability: Not on file    Transportation needs:     Medical: Not on file     Non-medical: Not on file   Tobacco Use    Smoking status: Never Smoker    Smokeless tobacco: Never Used   Substance and Sexual Activity    Alcohol use: Yes     Comment: occ    Drug use: No    Sexual activity: Not Currently   Lifestyle    Physical activity:     Days per week: Not on file     Minutes per session: Not on file    Stress: Not on file   Relationships    Social connections:     Talks on phone: Not on file     Gets together: Not on file     Attends Jainism service: Not on file     Active member of club or organization: Not on file     Attends meetings of clubs or organizations: Not on file     Relationship status: Not on file    Intimate partner violence:     Fear of current or ex partner: Not on file     Emotionally abused: Not on file     Physically abused: Not on file     Forced sexual activity: Not on file   Other Topics Concern    Not on file   Social History Narrative    Not on file        Family History   Problem Relation Age of Onset    Diabetes Paternal Grandfather     Hypertension Father     Cancer Father         colon    Cancer Brother         kidney    No Known Problems Paternal Grandmother     No Known Problems Maternal Grandmother     No Known Problems Maternal Grandfather     No Known Problems Mother     No Known Problems Sister     No Known Problems Other     Breast Cancer Neg Hx     Colon Cancer Neg Hx     Eclampsia Neg Hx     Ovarian Cancer Neg Hx      Labor Neg Hx     Spont Abortions Neg Hx     Stroke Neg Hx

## 2019-09-13 ENCOUNTER — TELEPHONE (OUTPATIENT)
Dept: OBGYN CLINIC | Age: 49
End: 2019-09-13

## 2019-09-19 ENCOUNTER — OFFICE VISIT (OUTPATIENT)
Dept: FAMILY MEDICINE CLINIC | Age: 49
End: 2019-09-19
Payer: COMMERCIAL

## 2019-09-19 VITALS
WEIGHT: 258.8 LBS | SYSTOLIC BLOOD PRESSURE: 124 MMHG | TEMPERATURE: 98.2 F | OXYGEN SATURATION: 98 % | BODY MASS INDEX: 38.33 KG/M2 | DIASTOLIC BLOOD PRESSURE: 94 MMHG | HEART RATE: 79 BPM | HEIGHT: 69 IN

## 2019-09-19 DIAGNOSIS — F32.81 PMDD (PREMENSTRUAL DYSPHORIC DISORDER): ICD-10-CM

## 2019-09-19 DIAGNOSIS — F32.A DEPRESSION, UNSPECIFIED DEPRESSION TYPE: ICD-10-CM

## 2019-09-19 DIAGNOSIS — K21.9 GASTROESOPHAGEAL REFLUX DISEASE, ESOPHAGITIS PRESENCE NOT SPECIFIED: ICD-10-CM

## 2019-09-19 DIAGNOSIS — Z23 NEED FOR INFLUENZA VACCINATION: ICD-10-CM

## 2019-09-19 DIAGNOSIS — Z13.220 LIPID SCREENING: ICD-10-CM

## 2019-09-19 DIAGNOSIS — Z12.11 COLON CANCER SCREENING: ICD-10-CM

## 2019-09-19 DIAGNOSIS — R19.7 DIARRHEA, UNSPECIFIED TYPE: ICD-10-CM

## 2019-09-19 DIAGNOSIS — B35.4 TINEA CORPORIS: ICD-10-CM

## 2019-09-19 DIAGNOSIS — R10.32 LLQ ABDOMINAL PAIN: Primary | ICD-10-CM

## 2019-09-19 DIAGNOSIS — R10.814 LEFT LOWER QUADRANT ABDOMINAL TENDERNESS WITHOUT REBOUND TENDERNESS: ICD-10-CM

## 2019-09-19 DIAGNOSIS — E28.2 PCOS (POLYCYSTIC OVARIAN SYNDROME): ICD-10-CM

## 2019-09-19 DIAGNOSIS — I10 ESSENTIAL HYPERTENSION: ICD-10-CM

## 2019-09-19 LAB
ALBUMIN SERPL-MCNC: 4.4 G/DL (ref 3.5–4.6)
ALP BLD-CCNC: 70 U/L (ref 40–130)
ALT SERPL-CCNC: 17 U/L (ref 0–33)
ANION GAP SERPL CALCULATED.3IONS-SCNC: 13 MEQ/L (ref 9–15)
AST SERPL-CCNC: 19 U/L (ref 0–35)
BASOPHILS ABSOLUTE: 0 K/UL (ref 0–0.2)
BASOPHILS RELATIVE PERCENT: 0.4 %
BILIRUB SERPL-MCNC: 0.7 MG/DL (ref 0.2–0.7)
BUN BLDV-MCNC: 14 MG/DL (ref 6–20)
CALCIUM SERPL-MCNC: 9.7 MG/DL (ref 8.5–9.9)
CHLORIDE BLD-SCNC: 105 MEQ/L (ref 95–107)
CHOLESTEROL, TOTAL: 180 MG/DL (ref 0–199)
CO2: 22 MEQ/L (ref 20–31)
CREAT SERPL-MCNC: 0.78 MG/DL (ref 0.5–0.9)
EOSINOPHILS ABSOLUTE: 0.1 K/UL (ref 0–0.7)
EOSINOPHILS RELATIVE PERCENT: 1 %
GFR AFRICAN AMERICAN: >60
GFR NON-AFRICAN AMERICAN: >60
GLOBULIN: 3.2 G/DL (ref 2.3–3.5)
GLUCOSE BLD-MCNC: 98 MG/DL (ref 70–99)
HCT VFR BLD CALC: 44.4 % (ref 37–47)
HDLC SERPL-MCNC: 50 MG/DL (ref 40–59)
HEMOGLOBIN: 14.8 G/DL (ref 12–16)
LDL CHOLESTEROL CALCULATED: 105 MG/DL (ref 0–129)
LYMPHOCYTES ABSOLUTE: 2.2 K/UL (ref 1–4.8)
LYMPHOCYTES RELATIVE PERCENT: 30.7 %
MCH RBC QN AUTO: 30.4 PG (ref 27–31.3)
MCHC RBC AUTO-ENTMCNC: 33.3 % (ref 33–37)
MCV RBC AUTO: 91.5 FL (ref 82–100)
MONOCYTES ABSOLUTE: 0.6 K/UL (ref 0.2–0.8)
MONOCYTES RELATIVE PERCENT: 9 %
NEUTROPHILS ABSOLUTE: 4.2 K/UL (ref 1.4–6.5)
NEUTROPHILS RELATIVE PERCENT: 58.9 %
PDW BLD-RTO: 14.8 % (ref 11.5–14.5)
PLATELET # BLD: 260 K/UL (ref 130–400)
POTASSIUM SERPL-SCNC: 4.8 MEQ/L (ref 3.4–4.9)
RBC # BLD: 4.85 M/UL (ref 4.2–5.4)
SODIUM BLD-SCNC: 140 MEQ/L (ref 135–144)
TOTAL PROTEIN: 7.6 G/DL (ref 6.3–8)
TRIGL SERPL-MCNC: 126 MG/DL (ref 0–150)
WBC # BLD: 7.1 K/UL (ref 4.8–10.8)

## 2019-09-19 PROCEDURE — 90688 IIV4 VACCINE SPLT 0.5 ML IM: CPT | Performed by: NURSE PRACTITIONER

## 2019-09-19 PROCEDURE — 90471 IMMUNIZATION ADMIN: CPT | Performed by: NURSE PRACTITIONER

## 2019-09-19 PROCEDURE — 99214 OFFICE O/P EST MOD 30 MIN: CPT | Performed by: NURSE PRACTITIONER

## 2019-09-19 RX ORDER — OMEPRAZOLE 40 MG/1
CAPSULE, DELAYED RELEASE ORAL
Qty: 90 CAPSULE | Refills: 3 | Status: SHIPPED | OUTPATIENT
Start: 2019-09-19 | End: 2020-10-29 | Stop reason: SDUPTHER

## 2019-09-19 RX ORDER — IBUPROFEN 800 MG/1
800 TABLET ORAL EVERY 8 HOURS PRN
Qty: 200 TABLET | Refills: 3 | Status: SHIPPED | OUTPATIENT
Start: 2019-09-19 | End: 2020-10-29 | Stop reason: SDUPTHER

## 2019-09-19 RX ORDER — METOPROLOL SUCCINATE 100 MG/1
TABLET, EXTENDED RELEASE ORAL
Qty: 135 TABLET | Refills: 3 | Status: SHIPPED | OUTPATIENT
Start: 2019-09-19 | End: 2020-10-29 | Stop reason: SDUPTHER

## 2019-09-19 RX ORDER — KETOCONAZOLE 20 MG/G
CREAM TOPICAL
Qty: 60 G | Refills: 3 | Status: SHIPPED | OUTPATIENT
Start: 2019-09-19 | End: 2020-10-29 | Stop reason: ALTCHOICE

## 2019-09-19 ASSESSMENT — ENCOUNTER SYMPTOMS
COUGH: 0
DIARRHEA: 1
SHORTNESS OF BREATH: 0
HEMATOCHEZIA: 0
NAUSEA: 1
ABDOMINAL PAIN: 1
VOMITING: 0
CONSTIPATION: 0

## 2019-09-19 ASSESSMENT — PATIENT HEALTH QUESTIONNAIRE - PHQ9
SUM OF ALL RESPONSES TO PHQ QUESTIONS 1-9: 0
1. LITTLE INTEREST OR PLEASURE IN DOING THINGS: 0
SUM OF ALL RESPONSES TO PHQ9 QUESTIONS 1 & 2: 0
SUM OF ALL RESPONSES TO PHQ QUESTIONS 1-9: 0
2. FEELING DOWN, DEPRESSED OR HOPELESS: 0

## 2019-09-19 NOTE — PROGRESS NOTES
Subjective  Chief Complaint   Patient presents with   Manjit Aguilera Establish Care    Abdominal Pain     pt having lower LT side pain that started 3 weeks ago. states that it feels swollen and has diarrhea. states that the pain, diarrhea, and swelling are all mild. states that her temp has been a little elevated but not a fever. has not noticed any blood. Hypertension   This is a chronic problem. The current episode started more than 1 year ago. The problem is unchanged. The problem is controlled. Pertinent negatives include no chest pain, headaches, malaise/fatigue, palpitations, peripheral edema or shortness of breath. There are no associated agents to hypertension. Risk factors for coronary artery disease include obesity. Past treatments include beta blockers. The current treatment provides significant improvement. There are no compliance problems. There is no history of kidney disease, heart failure or PVD. Abdominal Pain   This is a new problem. The current episode started 1 to 4 weeks ago (3 weeks). The onset quality is gradual. The problem occurs constantly. The problem has been gradually worsening. The pain is located in the LLQ. The pain is mild. The quality of the pain is aching and dull. The abdominal pain radiates to the back. Associated symptoms include diarrhea (once daily, sometimes only 2-3 times per week) and nausea (every day). Pertinent negatives include no anorexia, arthralgias, constipation, fever, headaches, hematochezia, melena or vomiting. Nothing aggravates the pain. The pain is relieved by nothing. She has tried nothing for the symptoms. Prior diagnostic workup includes ultrasound (pelvic US, UA, treated with flagyl for BV). Pt here to establish care. Takes zoloft PRN. Has been taking it PRN for at least 3-4 years. Feels it does work well for her that way, but sometimes she does notice that it makes her a little more edgy. It does prevent \"her emotions from bubbling up\".      Gets 800 MG tablet     Sig: Take 1 tablet by mouth every 8 hours as needed for Pain     Dispense:  200 tablet     Refill:  3    omeprazole (PRILOSEC) 40 MG delayed release capsule     Sig: TAKE 1 CAPSULE DAILY     Dispense:  90 capsule     Refill:  3    metoprolol succinate (TOPROL XL) 100 MG extended release tablet     Sig: TAKE 1 1/2 TABLET DAILY     Dispense:  135 tablet     Refill:  3       Return in about 6 months (around 3/19/2020) for general check up, htn.    Theo Milligan, APRN - CNP

## 2019-09-20 ENCOUNTER — HOSPITAL ENCOUNTER (OUTPATIENT)
Dept: CT IMAGING | Age: 49
Discharge: HOME | End: 2019-09-22
Payer: COMMERCIAL

## 2019-09-20 VITALS — WEIGHT: 252 LBS | HEIGHT: 69 IN | BODY MASS INDEX: 37.33 KG/M2

## 2019-09-20 DIAGNOSIS — R10.32 LLQ ABDOMINAL PAIN: ICD-10-CM

## 2019-09-20 DIAGNOSIS — R19.7 DIARRHEA, UNSPECIFIED TYPE: ICD-10-CM

## 2019-09-20 DIAGNOSIS — R10.814 LEFT LOWER QUADRANT ABDOMINAL TENDERNESS WITHOUT REBOUND TENDERNESS: ICD-10-CM

## 2019-09-20 PROCEDURE — 6360000004 HC RX CONTRAST MEDICATION: Performed by: NURSE PRACTITIONER

## 2019-09-20 PROCEDURE — 2500000003 HC RX 250 WO HCPCS: Performed by: NURSE PRACTITIONER

## 2019-09-20 PROCEDURE — 74177 CT ABD & PELVIS W/CONTRAST: CPT

## 2019-09-20 RX ADMIN — BARIUM SULFATE 450 ML: 20 SUSPENSION ORAL at 15:53

## 2019-09-20 RX ADMIN — IOPAMIDOL 100 ML: 612 INJECTION, SOLUTION INTRAVENOUS at 15:53

## 2019-09-24 ENCOUNTER — PROCEDURE VISIT (OUTPATIENT)
Dept: OBGYN CLINIC | Age: 49
End: 2019-09-24
Payer: COMMERCIAL

## 2019-09-24 VITALS — BODY MASS INDEX: 38.1 KG/M2 | DIASTOLIC BLOOD PRESSURE: 84 MMHG | WEIGHT: 258 LBS | SYSTOLIC BLOOD PRESSURE: 128 MMHG

## 2019-09-24 DIAGNOSIS — R93.89 THICKENED ENDOMETRIUM: Primary | ICD-10-CM

## 2019-09-24 DIAGNOSIS — N83.201 CYST OF RIGHT OVARY: ICD-10-CM

## 2019-09-24 DIAGNOSIS — R97.1 ELEVATED CA-125: ICD-10-CM

## 2019-09-24 LAB
HCG, URINE, POC: NEGATIVE
Lab: NORMAL
NEGATIVE QC PASS/FAIL: NORMAL
POSITIVE QC PASS/FAIL: NORMAL

## 2019-09-24 PROCEDURE — 58100 BIOPSY OF UTERUS LINING: CPT | Performed by: OBSTETRICS & GYNECOLOGY

## 2019-09-24 PROCEDURE — 99213 OFFICE O/P EST LOW 20 MIN: CPT | Performed by: OBSTETRICS & GYNECOLOGY

## 2019-09-24 PROCEDURE — 81025 URINE PREGNANCY TEST: CPT | Performed by: OBSTETRICS & GYNECOLOGY

## 2019-09-24 ASSESSMENT — ENCOUNTER SYMPTOMS
CONSTIPATION: 0
EYES NEGATIVE: 1
VOMITING: 0
NAUSEA: 0
RECTAL PAIN: 0
ABDOMINAL DISTENTION: 0
ALLERGIC/IMMUNOLOGIC NEGATIVE: 1
ANAL BLEEDING: 0
BLOOD IN STOOL: 0
RESPIRATORY NEGATIVE: 1
ABDOMINAL PAIN: 0
DIARRHEA: 0

## 2019-09-25 ENCOUNTER — OFFICE VISIT (OUTPATIENT)
Dept: GASTROENTEROLOGY | Age: 49
End: 2019-09-25
Payer: COMMERCIAL

## 2019-09-25 VITALS
HEART RATE: 62 BPM | SYSTOLIC BLOOD PRESSURE: 130 MMHG | WEIGHT: 261 LBS | OXYGEN SATURATION: 97 % | HEIGHT: 69 IN | DIASTOLIC BLOOD PRESSURE: 72 MMHG | BODY MASS INDEX: 38.66 KG/M2 | TEMPERATURE: 97.6 F

## 2019-09-25 DIAGNOSIS — Z80.0 FAMILY HISTORY OF COLON CANCER IN FATHER: ICD-10-CM

## 2019-09-25 DIAGNOSIS — R19.7 DIARRHEA, UNSPECIFIED TYPE: ICD-10-CM

## 2019-09-25 DIAGNOSIS — R10.32 LLQ PAIN: Primary | ICD-10-CM

## 2019-09-25 DIAGNOSIS — R11.0 NAUSEA: ICD-10-CM

## 2019-09-25 PROCEDURE — 99204 OFFICE O/P NEW MOD 45 MIN: CPT | Performed by: INTERNAL MEDICINE

## 2019-09-25 RX ORDER — SODIUM, POTASSIUM,MAG SULFATES 17.5-3.13G
SOLUTION, RECONSTITUTED, ORAL ORAL
Qty: 1 BOTTLE | Refills: 0 | Status: SHIPPED | OUTPATIENT
Start: 2019-09-25 | End: 2020-10-29 | Stop reason: ALTCHOICE

## 2019-09-25 RX ORDER — HYOSCYAMINE SULFATE 0.12 MG/1
125 TABLET SUBLINGUAL EVERY 4 HOURS PRN
Qty: 120 EACH | Refills: 3 | Status: SHIPPED | OUTPATIENT
Start: 2019-09-25 | End: 2020-10-29 | Stop reason: ALTCHOICE

## 2019-09-25 NOTE — PROGRESS NOTES
tablet TAKE 1 TABLET DAILY 90 tablet 3    ketoconazole (NIZORAL) 2 % cream Apply topically 2 times per day 60 g 3    ibuprofen (ADVIL;MOTRIN) 800 MG tablet Take 1 tablet by mouth every 8 hours as needed for Pain 200 tablet 3    omeprazole (PRILOSEC) 40 MG delayed release capsule TAKE 1 CAPSULE DAILY 90 capsule 3    metoprolol succinate (TOPROL XL) 100 MG extended release tablet TAKE 1 1/2 TABLET DAILY 135 tablet 3    Multiple Vitamins-Minerals (CENTRUM ULTRA WOMENS) TABS Take  by mouth. No current facility-administered medications on file prior to visit.       Family History   Problem Relation Age of Onset    Diabetes Paternal Grandfather     Hypertension Father     Colon Cancer Father     Diabetes Father     High Blood Pressure Father     Cancer Brother         kidney    High Blood Pressure Brother     No Known Problems Paternal Grandmother     No Known Problems Maternal Grandmother     No Known Problems Maternal Grandfather     High Blood Pressure Mother     Other Mother         leg artery malfunction, causes swelling of the leg    No Known Problems Sister     No Known Problems Other     Breast Cancer Neg Hx     Eclampsia Neg Hx     Ovarian Cancer Neg Hx      Labor Neg Hx     Spont Abortions Neg Hx     Stroke Neg Hx       Social History     Socioeconomic History    Marital status:      Spouse name: None    Number of children: None    Years of education: None    Highest education level: None   Occupational History    None   Social Needs    Financial resource strain: None    Food insecurity:     Worry: None     Inability: None    Transportation needs:     Medical: None     Non-medical: None   Tobacco Use    Smoking status: Never Smoker    Smokeless tobacco: Never Used   Substance and Sexual Activity    Alcohol use: Yes     Comment: occ    Drug use: No    Sexual activity: Not Currently   Lifestyle    Physical activity:     Days per week: None     Minutes per sonographic examination. Left adnexal cystic structures, most likely ovarian, corresponding to finding is visualized on recent sonographic examination as described. All CT scans at this facility use dose modulation, iterative reconstruction, and/or weight based dosing when appropriate to reduce radiation dose to as low as reasonably achievable. Ct Abdomen Pelvis W Iv Contrast Additional Contrast? Radiologist Recommendation  Result Date: 9/20/2019  Right adnexal 4 cm mass. Finding corresponds to enlarged right ovary with complex cyst, visualized on recent sonographic examination. Left adnexal cystic structures, most likely ovarian, corresponding to finding is visualized on recent sonographic examination as described. Endoscopic investigations: Upper endoscopy in the 90s, no previous colonoscopy    Assessmentand Plan:  46 y.o. female with  1. LLQ pain  -Colonoscopy to evaluate for presence of diverticula  - Hyoscyamine Sulfate SL (LEVSIN/SL) 0.125 MG SUBL; Place 125 mcg under the tongue every 4 hours as needed (pain) for symptomatic control    2. Diarrhea, unspecified type  - Calprotectin Stool; Future  - Clostridium difficile EIA; Future  -Colonoscopy to investigate further, recommend random colon biopsies    3. Nausea  - EGD to get further, given long-standing history of reflux, upper endoscopy indicated to evaluate for Quiñonez's nonetheless    4. Family history of colon cancer in father  - Colonoscopy is indicated, procedure was discussed at length, including the risks and benefits. Split prep was prescribed and discussed. Importance of the prep in ensuring a good exam was emphasized. - [place]    Return if symptoms worsen or fail to improve.     Nadia Mixon MD   Staff Gastroenterologist  [facility]    Please note this report has been partially produced using speech recognition software and may cause contain errors related to thatsystem including grammar, punctuation and spelling as well

## 2019-09-27 ENCOUNTER — TELEPHONE (OUTPATIENT)
Dept: OBGYN CLINIC | Age: 49
End: 2019-09-27

## 2019-10-02 ENCOUNTER — TELEPHONE (OUTPATIENT)
Dept: OBGYN CLINIC | Age: 49
End: 2019-10-02

## 2019-10-04 DIAGNOSIS — R19.7 DIARRHEA, UNSPECIFIED TYPE: ICD-10-CM

## 2019-10-04 LAB — C DIFF TOXIN/ANTIGEN: NORMAL

## 2019-10-07 ENCOUNTER — ANESTHESIA (OUTPATIENT)
Dept: ENDOSCOPY | Age: 49
End: 2019-10-07
Payer: COMMERCIAL

## 2019-10-07 ENCOUNTER — ANESTHESIA EVENT (OUTPATIENT)
Dept: ENDOSCOPY | Age: 49
End: 2019-10-07
Payer: COMMERCIAL

## 2019-10-07 ENCOUNTER — HOSPITAL ENCOUNTER (OUTPATIENT)
Age: 49
Setting detail: OUTPATIENT SURGERY
Discharge: HOME | End: 2019-10-07
Attending: INTERNAL MEDICINE | Admitting: INTERNAL MEDICINE
Payer: COMMERCIAL

## 2019-10-07 VITALS
HEIGHT: 69 IN | OXYGEN SATURATION: 95 % | BODY MASS INDEX: 37.33 KG/M2 | SYSTOLIC BLOOD PRESSURE: 106 MMHG | WEIGHT: 252 LBS | TEMPERATURE: 98.1 F | RESPIRATION RATE: 18 BRPM | DIASTOLIC BLOOD PRESSURE: 74 MMHG | HEART RATE: 67 BPM

## 2019-10-07 VITALS
OXYGEN SATURATION: 96 % | SYSTOLIC BLOOD PRESSURE: 117 MMHG | DIASTOLIC BLOOD PRESSURE: 63 MMHG | RESPIRATION RATE: 11 BRPM

## 2019-10-07 LAB — CALPROTECTIN: 31 UG/G

## 2019-10-07 PROCEDURE — 43239 EGD BIOPSY SINGLE/MULTIPLE: CPT | Performed by: INTERNAL MEDICINE

## 2019-10-07 PROCEDURE — 88305 TISSUE EXAM BY PATHOLOGIST: CPT

## 2019-10-07 PROCEDURE — 45380 COLONOSCOPY AND BIOPSY: CPT | Performed by: INTERNAL MEDICINE

## 2019-10-07 PROCEDURE — 88342 IMHCHEM/IMCYTCHM 1ST ANTB: CPT

## 2019-10-07 PROCEDURE — 3609017100 HC EGD: Performed by: INTERNAL MEDICINE

## 2019-10-07 PROCEDURE — 3700000001 HC ADD 15 MINUTES (ANESTHESIA): Performed by: INTERNAL MEDICINE

## 2019-10-07 PROCEDURE — 2500000003 HC RX 250 WO HCPCS: Performed by: NURSE ANESTHETIST, CERTIFIED REGISTERED

## 2019-10-07 PROCEDURE — 7100000010 HC PHASE II RECOVERY - FIRST 15 MIN: Performed by: INTERNAL MEDICINE

## 2019-10-07 PROCEDURE — 2580000003 HC RX 258: Performed by: INTERNAL MEDICINE

## 2019-10-07 PROCEDURE — 3609009500 HC COLONOSCOPY DIAGNOSTIC OR SCREENING: Performed by: INTERNAL MEDICINE

## 2019-10-07 PROCEDURE — 3700000000 HC ANESTHESIA ATTENDED CARE: Performed by: INTERNAL MEDICINE

## 2019-10-07 PROCEDURE — 7100000011 HC PHASE II RECOVERY - ADDTL 15 MIN: Performed by: INTERNAL MEDICINE

## 2019-10-07 PROCEDURE — 45385 COLONOSCOPY W/LESION REMOVAL: CPT | Performed by: INTERNAL MEDICINE

## 2019-10-07 PROCEDURE — 6360000002 HC RX W HCPCS: Performed by: NURSE ANESTHETIST, CERTIFIED REGISTERED

## 2019-10-07 RX ORDER — SODIUM CHLORIDE 0.9 % (FLUSH) 0.9 %
10 SYRINGE (ML) INJECTION EVERY 12 HOURS SCHEDULED
Status: DISCONTINUED | OUTPATIENT
Start: 2019-10-07 | End: 2019-10-07 | Stop reason: HOSPADM

## 2019-10-07 RX ORDER — SODIUM CHLORIDE 0.9 % (FLUSH) 0.9 %
10 SYRINGE (ML) INJECTION PRN
Status: DISCONTINUED | OUTPATIENT
Start: 2019-10-07 | End: 2019-10-07 | Stop reason: HOSPADM

## 2019-10-07 RX ORDER — LIDOCAINE HYDROCHLORIDE 20 MG/ML
INJECTION, SOLUTION INFILTRATION; PERINEURAL PRN
Status: DISCONTINUED | OUTPATIENT
Start: 2019-10-07 | End: 2019-10-07 | Stop reason: SDUPTHER

## 2019-10-07 RX ORDER — SODIUM CHLORIDE 9 MG/ML
INJECTION, SOLUTION INTRAVENOUS CONTINUOUS
Status: DISCONTINUED | OUTPATIENT
Start: 2019-10-07 | End: 2019-10-07 | Stop reason: HOSPADM

## 2019-10-07 RX ORDER — LIDOCAINE HYDROCHLORIDE 10 MG/ML
1 INJECTION, SOLUTION EPIDURAL; INFILTRATION; INTRACAUDAL; PERINEURAL
Status: DISCONTINUED | OUTPATIENT
Start: 2019-10-07 | End: 2019-10-07 | Stop reason: HOSPADM

## 2019-10-07 RX ORDER — PROPOFOL 10 MG/ML
INJECTION, EMULSION INTRAVENOUS PRN
Status: DISCONTINUED | OUTPATIENT
Start: 2019-10-07 | End: 2019-10-07 | Stop reason: SDUPTHER

## 2019-10-07 RX ADMIN — PROPOFOL 360 MG: 10 INJECTION, EMULSION INTRAVENOUS at 08:13

## 2019-10-07 RX ADMIN — LIDOCAINE HYDROCHLORIDE 50 MG: 20 INJECTION, SOLUTION INFILTRATION; PERINEURAL at 08:13

## 2019-10-07 RX ADMIN — SODIUM CHLORIDE: 9 INJECTION, SOLUTION INTRAVENOUS at 07:36

## 2019-10-07 ASSESSMENT — PAIN DESCRIPTION - DESCRIPTORS: DESCRIPTORS: ACHING

## 2019-10-07 ASSESSMENT — PAIN - FUNCTIONAL ASSESSMENT: PAIN_FUNCTIONAL_ASSESSMENT: 0-10

## 2019-10-08 DIAGNOSIS — Z80.0 FAMILY HISTORY OF COLON CANCER IN FATHER: ICD-10-CM

## 2019-10-08 DIAGNOSIS — R10.32 LLQ PAIN: ICD-10-CM

## 2019-10-08 DIAGNOSIS — R19.7 DIARRHEA, UNSPECIFIED TYPE: ICD-10-CM

## 2019-10-08 DIAGNOSIS — R11.0 NAUSEA: ICD-10-CM

## 2019-12-18 ENCOUNTER — HOSPITAL ENCOUNTER (OUTPATIENT)
Dept: WOMENS IMAGING | Age: 49
Discharge: HOME | End: 2019-12-20
Payer: COMMERCIAL

## 2019-12-18 DIAGNOSIS — Z12.31 SCREENING MAMMOGRAM, ENCOUNTER FOR: ICD-10-CM

## 2019-12-18 PROCEDURE — 77063 BREAST TOMOSYNTHESIS BI: CPT

## 2019-12-27 ENCOUNTER — TELEPHONE (OUTPATIENT)
Dept: ADMINISTRATIVE | Age: 49
End: 2019-12-27

## 2019-12-30 ENCOUNTER — TELEPHONE (OUTPATIENT)
Dept: OBGYN CLINIC | Age: 49
End: 2019-12-30

## 2019-12-30 DIAGNOSIS — R92.8 ABNORMAL MAMMOGRAM OF RIGHT BREAST: Primary | ICD-10-CM

## 2020-01-06 ENCOUNTER — HOSPITAL ENCOUNTER (OUTPATIENT)
Dept: ULTRASOUND IMAGING | Age: 50
Discharge: HOME | End: 2020-01-08
Payer: COMMERCIAL

## 2020-01-06 PROCEDURE — 76642 ULTRASOUND BREAST LIMITED: CPT

## 2020-03-19 ENCOUNTER — TELEMEDICINE (OUTPATIENT)
Dept: FAMILY MEDICINE CLINIC | Age: 50
End: 2020-03-19
Payer: COMMERCIAL

## 2020-03-19 PROCEDURE — 99442 PR PHYS/QHP TELEPHONE EVALUATION 11-20 MIN: CPT | Performed by: NURSE PRACTITIONER

## 2020-03-19 ASSESSMENT — ENCOUNTER SYMPTOMS
CHEST TIGHTNESS: 0
NAUSEA: 0
ABDOMINAL PAIN: 0
DIARRHEA: 0
BACK PAIN: 0
COUGH: 0
CONSTIPATION: 0
VOMITING: 0
PHOTOPHOBIA: 0
SHORTNESS OF BREATH: 0

## 2020-03-19 NOTE — PROGRESS NOTES
Patient has been screened to determine that this visit qualifies for a \"Telephone Visit\". Patient is currently established with the current medical practice, the condition being reviewed was not addressed within the previous 7 days and is not likely be determined to need a procedure within the next 24 hours. The patient is aware and has given verbal consent to be billed for this telephone encounter. HPI:    Hypertension  Patient is on phone for follow-up of elevated blood pressure. Blood pressure is well controlled at home. Cardiac symptoms: none. Patient denies chest pain, chest pressure/discomfort, dyspnea, exertional chest pressure/discomfort, fatigue, irregular heart beat, lower extremity edema, palpitations and syncope. Cardiovascular risk factors: hypertension and obesity (BMI >= 30 kg/m2). Use of agents associated with hypertension: NSAIDS. History of target organ damage: none. She did have a list or her blood pressures faxed over to us. All blood pressures were okay, averaging around 110s-130s/80s-90s. Pt reports she has been checking it when she is stressed so that is why is was running on the higher side. She is continuing to take zoloft, usually PRN; however has been taking it daily d/t stress related to her job recently with everything going on with coronavirus outbreak. It is working well for her and she is well controlled on this regimen. She did have ovary removal since she was seen here last time d/t multiple ovarian cysts. Cysts were found to be filled with endometrial tissue, but were all benign. Her abdominal pain \"disappeared\" after that surgery. She is continuing to follow with Dr. Gemma Jorgensen for her paps. She is current on all her labs. Done in September and all were okay. See ROS for other associated symptoms.     PMH:    Current Outpatient Medications on File Prior to Visit   Medication Sig Dispense Refill    Fluticasone Propionate (FLONASE NA) by Nasal route      Na Used   Substance and Sexual Activity    Alcohol use: Yes     Comment: occ    Drug use: No    Sexual activity: Not Currently   Lifestyle    Physical activity     Days per week: Not on file     Minutes per session: Not on file    Stress: Not on file   Relationships    Social connections     Talks on phone: Not on file     Gets together: Not on file     Attends Evangelical service: Not on file     Active member of club or organization: Not on file     Attends meetings of clubs or organizations: Not on file     Relationship status: Not on file    Intimate partner violence     Fear of current or ex partner: Not on file     Emotionally abused: Not on file     Physically abused: Not on file     Forced sexual activity: Not on file   Other Topics Concern    Not on file   Social History Narrative    Not on file     Family History   Problem Relation Age of Onset    Diabetes Paternal Grandfather     Hypertension Father     Colon Cancer Father     Diabetes Father     High Blood Pressure Father     Cancer Brother         kidney    High Blood Pressure Brother     No Known Problems Paternal Grandmother     No Known Problems Maternal Grandmother     No Known Problems Maternal Grandfather     High Blood Pressure Mother     Other Mother         leg artery malfunction, causes swelling of the leg    No Known Problems Sister     No Known Problems Other     Breast Cancer Neg Hx     Eclampsia Neg Hx     Ovarian Cancer Neg Hx      Labor Neg Hx     Spont Abortions Neg Hx     Stroke Neg Hx      Allergies:  Patient has no known allergies. Review of Systems   Constitutional: Negative for activity change, appetite change, chills, diaphoresis, fatigue and fever. HENT: Negative for congestion and ear pain. Eyes: Negative for photophobia and visual disturbance. Respiratory: Negative for cough, chest tightness and shortness of breath.     Cardiovascular: Negative for chest pain, palpitations and leg swelling. Gastrointestinal: Negative for abdominal pain, constipation, diarrhea, nausea and vomiting. Endocrine: Negative for polydipsia, polyphagia and polyuria. Genitourinary: Negative for difficulty urinating and dysuria. Musculoskeletal: Negative for arthralgias and back pain. Neurological: Negative for dizziness and headaches. Psychiatric/Behavioral: Negative for dysphoric mood. The patient is not nervous/anxious. PHYSICAL EXAM/ RESULTS    No results found for this or any previous visit (from the past 2016 hour(s)). Lab Results   Component Value Date    WBC 9.0 10/25/2019    HGB 13.6 10/25/2019    HCT 42.4 10/25/2019     10/25/2019    CHOL 180 09/19/2019    TRIG 126 09/19/2019    HDL 50 09/19/2019    ALT 23 10/25/2019    AST 19 10/25/2019     10/25/2019    K 4.6 10/25/2019     10/25/2019    CREATININE 0.90 10/25/2019    BUN 14 09/19/2019    CO2 22 09/19/2019    TSH 2.570 06/21/2016    LABA1C 5.5 10/03/2017         Assessment:       Diagnosis Orders   1. Essential hypertension     2. PMDD (premenstrual dysphoric disorder)           No orders of the defined types were placed in this encounter. Plan:   Return in about 3 months (around 6/19/2020) for htn. Patient advised to occasionally monitor blood pressure at home and call office if blood pressure consistently elevated >140/85. Continue with medications as ordered. Watch excess salt intake as it can contribute to elevations in blood pressure. Patient verbalized understanding. Continue zoloft as prescribed. Time spent during telephone visit was 15 minutes. F/u for in person visit in 3 months. There are no Patient Instructions on file for this visit.         Nany SMALL.

## 2020-10-29 ENCOUNTER — OFFICE VISIT (OUTPATIENT)
Dept: FAMILY MEDICINE CLINIC | Age: 50
End: 2020-10-29
Payer: COMMERCIAL

## 2020-10-29 VITALS
HEART RATE: 79 BPM | OXYGEN SATURATION: 99 % | SYSTOLIC BLOOD PRESSURE: 124 MMHG | TEMPERATURE: 97.3 F | WEIGHT: 278.8 LBS | DIASTOLIC BLOOD PRESSURE: 92 MMHG | HEIGHT: 69 IN | BODY MASS INDEX: 41.29 KG/M2

## 2020-10-29 PROCEDURE — 99214 OFFICE O/P EST MOD 30 MIN: CPT | Performed by: NURSE PRACTITIONER

## 2020-10-29 RX ORDER — IBUPROFEN 800 MG/1
800 TABLET ORAL EVERY 8 HOURS PRN
Qty: 200 TABLET | Refills: 3 | Status: SHIPPED | OUTPATIENT
Start: 2020-10-29 | End: 2021-10-29 | Stop reason: SDUPTHER

## 2020-10-29 RX ORDER — LISINOPRIL 5 MG/1
5 TABLET ORAL DAILY
Qty: 30 TABLET | Refills: 5 | Status: SHIPPED | OUTPATIENT
Start: 2020-10-29 | End: 2020-11-19 | Stop reason: DRUGHIGH

## 2020-10-29 RX ORDER — OMEPRAZOLE 40 MG/1
CAPSULE, DELAYED RELEASE ORAL
Qty: 90 CAPSULE | Refills: 3 | Status: SHIPPED | OUTPATIENT
Start: 2020-10-29 | End: 2021-10-29 | Stop reason: SDUPTHER

## 2020-10-29 RX ORDER — METOPROLOL SUCCINATE 100 MG/1
TABLET, EXTENDED RELEASE ORAL
Qty: 135 TABLET | Refills: 3 | Status: SHIPPED | OUTPATIENT
Start: 2020-10-29 | End: 2021-10-29 | Stop reason: SDUPTHER

## 2020-10-29 SDOH — ECONOMIC STABILITY: INCOME INSECURITY: HOW HARD IS IT FOR YOU TO PAY FOR THE VERY BASICS LIKE FOOD, HOUSING, MEDICAL CARE, AND HEATING?: NOT HARD AT ALL

## 2020-10-29 SDOH — ECONOMIC STABILITY: FOOD INSECURITY: WITHIN THE PAST 12 MONTHS, THE FOOD YOU BOUGHT JUST DIDN'T LAST AND YOU DIDN'T HAVE MONEY TO GET MORE.: NEVER TRUE

## 2020-10-29 SDOH — ECONOMIC STABILITY: FOOD INSECURITY: WITHIN THE PAST 12 MONTHS, YOU WORRIED THAT YOUR FOOD WOULD RUN OUT BEFORE YOU GOT MONEY TO BUY MORE.: NEVER TRUE

## 2020-10-29 SDOH — ECONOMIC STABILITY: TRANSPORTATION INSECURITY
IN THE PAST 12 MONTHS, HAS THE LACK OF TRANSPORTATION KEPT YOU FROM MEDICAL APPOINTMENTS OR FROM GETTING MEDICATIONS?: NO

## 2020-10-29 SDOH — ECONOMIC STABILITY: TRANSPORTATION INSECURITY
IN THE PAST 12 MONTHS, HAS LACK OF TRANSPORTATION KEPT YOU FROM MEETINGS, WORK, OR FROM GETTING THINGS NEEDED FOR DAILY LIVING?: NO

## 2020-10-29 ASSESSMENT — ENCOUNTER SYMPTOMS
EYE PAIN: 0
CHEST TIGHTNESS: 0
CONSTIPATION: 0
COLOR CHANGE: 0
SHORTNESS OF BREATH: 0
DIARRHEA: 0
COUGH: 0
BACK PAIN: 0
TROUBLE SWALLOWING: 0
ABDOMINAL PAIN: 0

## 2020-10-29 NOTE — PROGRESS NOTES
Subjective  Chief Complaint   Patient presents with    Annual Exam       Hypertension   This is a chronic problem. The current episode started more than 1 year ago. The problem is unchanged. The problem is controlled. Pertinent negatives include no chest pain, malaise/fatigue, palpitations, peripheral edema or shortness of breath. Agents associated with hypertension include NSAIDs. Risk factors for coronary artery disease include obesity and post-menopausal state. Past treatments include beta blockers. The current treatment provides significant improvement. There are no compliance problems. There is no history of CAD/MI, heart failure or PVD. Taking prilosec daily for GERD, well controlled on this regimen. Moods are well controlled on the zoloft. No issues with depression. Due for labs. Brought in bp readings from home. Systolic averages around 251E-791Q, diastolic averages high 55D-64H.      Past Medical History:   Diagnosis Date    Depression     Depression     GERD (gastroesophageal reflux disease)     Hypertention, malignant, with acute intensive management     PCOS (polycystic ovarian syndrome)      Patient Active Problem List    Diagnosis Date Noted    PCOS (polycystic ovarian syndrome)     GERD (gastroesophageal reflux disease)     Depression     Allergic rhinitis 02/14/2017    Essential hypertension 02/11/2013    PCOD (polycystic ovarian disease) 02/11/2013     Past Surgical History:   Procedure Laterality Date    COLONOSCOPY N/A 10/7/2019    COLONOSCOPY DIAGNOSTIC performed by Roxanna Hines MD at [facility] [address]  11/12/2019        UPPER GASTROINTESTINAL ENDOSCOPY N/A 10/7/2019    EGD ESOPHAGOGASTRODUODENOSCOPY performed by R[facility] Hines MD at [facility]     Family History   Problem Relation Age of Onset    Diabetes Paternal Grandfather     Hypertension Father     Colon Cancer Father     Diabetes Father     High Blood Pressure Father     Cancer Brother         kidney    High Blood Pressure Brother     No Known Problems Paternal Grandmother     No Known Problems Maternal Grandmother     No Known Problems Maternal Grandfather     High Blood Pressure Mother     Other Mother         leg artery malfunction, causes swelling of the leg    No Known Problems Sister     No Known Problems Other     Breast Cancer Neg Hx     Eclampsia Neg Hx     Ovarian Cancer Neg Hx      Labor Neg Hx     Spont Abortions Neg Hx     Stroke Neg Hx      Social History     Socioeconomic History    Marital status:      Spouse name: None    Number of children: None    Years of education: None    Highest education level: None   Occupational History    None   Social Needs    Financial resource strain: Not hard at all   [email or web address] insecurity     Worry: Never true     Inability: Never true    Transportation needs     Medical: No     Non-medical: No   Tobacco Use    Smoking status: Never Smoker    Smokeless tobacco: Never Used   Substance and Sexual Activity    Alcohol use: Yes     Comment: occ    Drug use: No    Sexual activity: Not Currently   Lifestyle    Physical activity     Days per week: None     Minutes per session: None    Stress: None   Relationships    Social connections     Talks on phone: None     Gets together: None     Attends Mandaen service: None     Active member of club or organization: None     Attends meetings of clubs or organizations: None     Relationship status: None    Intimate partner violence     Fear of current or ex partner: None     Emotionally abused: None     Physically abused: None     Forced sexual activity: None   Other Topics Concern    None   Social History Narrative    None     Current Outpatient Medications on File Prior to Visit   Medication Sig Dispense Refill    Fluticasone Propionate (FLONASE NA) by Nasal route      Multiple Vitamins-Minerals (CENTRUM ULTRA normal. No congestion or rhinorrhea. Eyes:      General:         Right eye: No discharge. Left eye: No discharge. Conjunctiva/sclera: Conjunctivae normal.      Pupils: Pupils are equal, round, and reactive to light. Neck:      Thyroid: No thyromegaly. Cardiovascular:      Rate and Rhythm: Normal rate and regular rhythm. Heart sounds: Normal heart sounds. No murmur. Pulmonary:      Effort: Pulmonary effort is normal. No respiratory distress. Breath sounds: Normal breath sounds. No stridor. No wheezing, rhonchi or rales. Chest:      Chest wall: No tenderness. Musculoskeletal: Normal range of motion. Right lower leg: No edema. Left lower leg: No edema. Lymphadenopathy:      Cervical: No cervical adenopathy. Skin:     General: Skin is warm and dry. Capillary Refill: Capillary refill takes less than 2 seconds. Coloration: Skin is not jaundiced or pale. Findings: No bruising, erythema, lesion or rash. Neurological:      General: No focal deficit present. Mental Status: She is alert and oriented to person, place, and time. Mental status is at baseline. Cranial Nerves: No cranial nerve deficit. Coordination: Coordination normal.      Gait: Gait normal.   Psychiatric:         Mood and Affect: Mood normal.         Speech: Speech normal.         Behavior: Behavior normal.         Thought Content: Thought content normal.         Judgment: Judgment normal.         Assessment& Plan     Diagnosis Orders   1. Essential hypertension  metoprolol succinate (TOPROL XL) 100 MG extended release tablet    CBC With Auto Differential    Comprehensive Metabolic Panel    lisinopril (PRINIVIL;ZESTRIL) 5 MG tablet   2. Gastroesophageal reflux disease without esophagitis  omeprazole (PRILOSEC) 40 MG delayed release capsule   3. PMDD (premenstrual dysphoric disorder)  sertraline (ZOLOFT) 50 MG tablet    ibuprofen (ADVIL;MOTRIN) 800 MG tablet   4.  Lipid screening Lipid Panel     Add on low dose lisinopril d/t persistently elevated DBP. Continue to monitor bp at home and f/u in 3 weeks for recheck. Continue current regimen. Check labs as ordered. Side effects, adverse effects of the medication prescribed today, as well as treatment plan/ rationale and result expectations have been discussed with the patient who expresses understanding and desires to proceed. Close follow up to evaluate treatment results and for coordination of care. I have reviewed the patient's medical history in detail and updated the computerized patient record. As always, patient is advised that if symptoms worsen in any way they will proceed to the nearest emergency room. Orders Placed This Encounter   Procedures    CBC With Auto Differential     Standing Status:   Future     Standing Expiration Date:   10/29/2021    Comprehensive Metabolic Panel     Standing Status:   Future     Standing Expiration Date:   10/29/2021    Lipid Panel     Standing Status:   Future     Standing Expiration Date:   10/29/2021     Order Specific Question:   Is Patient Fasting?/# of Hours     Answer:   y/12       Orders Placed This Encounter   Medications    metoprolol succinate (TOPROL XL) 100 MG extended release tablet     Sig: TAKE 1 1/2 TABLET DAILY     Dispense:  135 tablet     Refill:  3    omeprazole (PRILOSEC) 40 MG delayed release capsule     Sig: TAKE 1 CAPSULE DAILY     Dispense:  90 capsule     Refill:  3    sertraline (ZOLOFT) 50 MG tablet     Sig: TAKE 1 TABLET DAILY     Dispense:  90 tablet     Refill:  3    ibuprofen (ADVIL;MOTRIN) 800 MG tablet     Sig: Take 1 tablet by mouth every 8 hours as needed for Pain     Dispense:  200 tablet     Refill:  3    lisinopril (PRINIVIL;ZESTRIL) 5 MG tablet     Sig: Take 1 tablet by mouth daily     Dispense:  30 tablet     Refill:  5       Return in about 2 weeks (around 11/12/2020) for htn, go over labs.     GERTRUDE Stephenson - CNP

## 2020-11-06 DIAGNOSIS — Z13.220 LIPID SCREENING: ICD-10-CM

## 2020-11-06 DIAGNOSIS — I10 ESSENTIAL HYPERTENSION: ICD-10-CM

## 2020-11-06 LAB
ALBUMIN SERPL-MCNC: 4.2 G/DL (ref 3.5–4.6)
ALP BLD-CCNC: 90 U/L (ref 40–130)
ALT SERPL-CCNC: 20 U/L (ref 0–33)
ANION GAP SERPL CALCULATED.3IONS-SCNC: 15 MEQ/L (ref 9–15)
AST SERPL-CCNC: 19 U/L (ref 0–35)
BASOPHILS ABSOLUTE: 0.1 K/UL (ref 0–0.2)
BASOPHILS RELATIVE PERCENT: 0.9 %
BILIRUB SERPL-MCNC: 0.5 MG/DL (ref 0.2–0.7)
BUN BLDV-MCNC: 14 MG/DL (ref 6–20)
CALCIUM SERPL-MCNC: 9.9 MG/DL (ref 8.5–9.9)
CHLORIDE BLD-SCNC: 101 MEQ/L (ref 95–107)
CHOLESTEROL, TOTAL: 189 MG/DL (ref 0–199)
CO2: 25 MEQ/L (ref 20–31)
CREAT SERPL-MCNC: 0.87 MG/DL (ref 0.5–0.9)
EOSINOPHILS ABSOLUTE: 0.1 K/UL (ref 0–0.7)
EOSINOPHILS RELATIVE PERCENT: 1.6 %
GFR AFRICAN AMERICAN: >60
GFR NON-AFRICAN AMERICAN: >60
GLOBULIN: 2.7 G/DL (ref 2.3–3.5)
GLUCOSE BLD-MCNC: 95 MG/DL (ref 70–99)
HCT VFR BLD CALC: 42.9 % (ref 37–47)
HDLC SERPL-MCNC: 54 MG/DL (ref 40–59)
HEMOGLOBIN: 14.3 G/DL (ref 12–16)
LDL CHOLESTEROL CALCULATED: 109 MG/DL (ref 0–129)
LYMPHOCYTES ABSOLUTE: 2.2 K/UL (ref 1–4.8)
LYMPHOCYTES RELATIVE PERCENT: 33.8 %
MCH RBC QN AUTO: 29.9 PG (ref 27–31.3)
MCHC RBC AUTO-ENTMCNC: 33.3 % (ref 33–37)
MCV RBC AUTO: 90 FL (ref 82–100)
MONOCYTES ABSOLUTE: 0.4 K/UL (ref 0.2–0.8)
MONOCYTES RELATIVE PERCENT: 6.2 %
NEUTROPHILS ABSOLUTE: 3.7 K/UL (ref 1.4–6.5)
NEUTROPHILS RELATIVE PERCENT: 57.5 %
PDW BLD-RTO: 14.1 % (ref 11.5–14.5)
PLATELET # BLD: 229 K/UL (ref 130–400)
POTASSIUM SERPL-SCNC: 4.6 MEQ/L (ref 3.4–4.9)
RBC # BLD: 4.77 M/UL (ref 4.2–5.4)
SODIUM BLD-SCNC: 141 MEQ/L (ref 135–144)
TOTAL PROTEIN: 6.9 G/DL (ref 6.3–8)
TRIGL SERPL-MCNC: 131 MG/DL (ref 0–150)
WBC # BLD: 6.5 K/UL (ref 4.8–10.8)

## 2020-11-19 ENCOUNTER — OFFICE VISIT (OUTPATIENT)
Dept: FAMILY MEDICINE CLINIC | Age: 50
End: 2020-11-19
Payer: COMMERCIAL

## 2020-11-19 VITALS
BODY MASS INDEX: 41.32 KG/M2 | OXYGEN SATURATION: 98 % | HEART RATE: 87 BPM | TEMPERATURE: 97.4 F | WEIGHT: 279 LBS | HEIGHT: 69 IN | SYSTOLIC BLOOD PRESSURE: 128 MMHG | DIASTOLIC BLOOD PRESSURE: 96 MMHG

## 2020-11-19 PROCEDURE — 99213 OFFICE O/P EST LOW 20 MIN: CPT | Performed by: NURSE PRACTITIONER

## 2020-11-19 RX ORDER — LISINOPRIL 10 MG/1
10 TABLET ORAL DAILY
Qty: 30 TABLET | Refills: 1 | Status: SHIPPED | OUTPATIENT
Start: 2020-11-19 | End: 2020-12-18 | Stop reason: SDUPTHER

## 2020-11-19 ASSESSMENT — ENCOUNTER SYMPTOMS
COUGH: 0
CHEST TIGHTNESS: 0
PHOTOPHOBIA: 0
BACK PAIN: 0
SHORTNESS OF BREATH: 0

## 2020-11-19 NOTE — PROGRESS NOTES
Subjective  Chief Complaint   Patient presents with    Check-Up     HTN, will need 90 day scripts of BP meds sent if keeping her on the ones she is taking now       HPI    F/u HTN. Started on lisinopril at 5 mg daily at last visit for elevated dbp, pt monitoring at home, bp continue to average 120s/90s consistent with bp here today. No side effects or issues.     Past Medical History:   Diagnosis Date    Depression     Depression     GERD (gastroesophageal reflux disease)     Hypertention, malignant, with acute intensive management     PCOS (polycystic ovarian syndrome)      Patient Active Problem List    Diagnosis Date Noted    PCOS (polycystic ovarian syndrome)     GERD (gastroesophageal reflux disease)     Depression     Allergic rhinitis 2017    Essential hypertension 2013    PCOD (polycystic ovarian disease) 2013     Past Surgical History:   Procedure Laterality Date    COLONOSCOPY N/A 10/7/2019    COLONOSCOPY DIAGNOSTIC performed by Meg Beard MD at [facility] [address]  2019        UPPER GASTROINTESTINAL ENDOSCOPY N/A 10/7/2019    EGD ESOPHAGOGASTRODUODENOSCOPY performed by Meg Beard MD at [facility]     Family History   Problem Relation Age of Onset    Diabetes Paternal Grandfather     Hypertension Father     Colon Cancer Father     Diabetes Father     High Blood Pressure Father     Cancer Brother         kidney    High Blood Pressure Brother     No Known Problems Paternal Grandmother     No Known Problems Maternal Grandmother     No Known Problems Maternal Grandfather     High Blood Pressure Mother     Other Mother         leg artery malfunction, causes swelling of the leg    No Known Problems Sister     No Known Problems Other     Breast Cancer Neg Hx     Eclampsia Neg Hx     Ovarian Cancer Neg Hx      Labor Neg Hx     Spont Abortions Neg Hx     Stroke Neg Hx Social History     Socioeconomic History    Marital status:      Spouse name: None    Number of children: None    Years of education: None    Highest education level: None   Occupational History    None   Social Needs    Financial resource strain: Not hard at all   Maximil[place] insecurity     Worry: Never true     Inability: Never true    Transportation needs     Medical: No     Non-medical: No   Tobacco Use    Smoking status: Never Smoker    Smokeless tobacco: Never Used   Substance and Sexual Activity    Alcohol use: Yes     Comment: occ    Drug use: No    Sexual activity: Not Currently   Lifestyle    Physical activity     Days per week: None     Minutes per session: None    Stress: None   Relationships    Social connections     Talks on phone: None     Gets together: None     Attends [employer] service: None     Active member of club or organization: None     Attends meetings of clubs or organizations: None     Relationship status: None    Intimate partner violence     Fear of current or ex partner: None     Emotionally abused: None     Physically abused: None     Forced sexual activity: None   Other Topics Concern    None   Social History Narrative    None     Current Outpatient Medications on File Prior to Visit   Medication Sig Dispense Refill    metoprolol succinate (TOPROL XL) 100 MG extended release tablet TAKE 1 1/2 TABLET DAILY 135 tablet 3    omeprazole (PRILOSEC) 40 MG delayed release capsule TAKE 1 CAPSULE DAILY 90 capsule 3    sertraline (ZOLOFT) 50 MG tablet TAKE 1 TABLET DAILY 90 tablet 3    ibuprofen (ADVIL;MOTRIN) 800 MG tablet Take 1 tablet by mouth every 8 hours as needed for Pain 200 tablet 3    Fluticasone Propionate (FLONASE NA) by Nasal route      Multiple Vitamins-Minerals (CENTRUM ULTRA WOMENS) TABS Take  by mouth. No current facility-administered medications on file prior to visit.       Allergies   Allergen Reactions    Eggs Or Egg-Derived Products Other (See Comments)     congestion    Food      Peanuts tingling of the mouth    Tomato      congestion       Review of Systems   Constitutional: Negative for chills, diaphoresis, fatigue and fever. HENT: Negative for congestion. Eyes: Negative for photophobia and visual disturbance. Respiratory: Negative for cough, chest tightness and shortness of breath. Cardiovascular: Negative for chest pain, palpitations and leg swelling. Endocrine: Negative for polydipsia, polyphagia and polyuria. Musculoskeletal: Negative for back pain. Neurological: Negative for dizziness and headaches. Psychiatric/Behavioral: Negative for dysphoric mood. The patient is not nervous/anxious. Objective  Vitals:    11/19/20 1638 11/19/20 1647   BP: (!) 128/98 (!) 128/96   Pulse: 87    Temp: 97.4 °F (36.3 °C)    TempSrc: Infrared    SpO2: 98%    Weight: 279 lb (126.6 kg)    Height: 5' 9\" (1.753 m)      Physical Exam  Constitutional:       General: She is not in acute distress. Appearance: Normal appearance. She is normal weight. She is not ill-appearing or toxic-appearing. HENT:      Head: Normocephalic and atraumatic. Right Ear: External ear normal.      Left Ear: External ear normal.   Neck:      Musculoskeletal: Normal range of motion and neck supple. No muscular tenderness. Cardiovascular:      Rate and Rhythm: Normal rate and regular rhythm. Pulses: Normal pulses. Heart sounds: Normal heart sounds. No murmur. Pulmonary:      Effort: Pulmonary effort is normal. No respiratory distress. Breath sounds: Normal breath sounds. No stridor. No wheezing, rhonchi or rales. Chest:      Chest wall: No tenderness. Musculoskeletal: Normal range of motion. Right lower leg: No edema. Left lower leg: No edema. Lymphadenopathy:      Cervical: No cervical adenopathy. Skin:     General: Skin is warm and dry. Capillary Refill: Capillary refill takes less than 2 seconds.

## 2020-12-18 ENCOUNTER — OFFICE VISIT (OUTPATIENT)
Dept: FAMILY MEDICINE CLINIC | Age: 50
End: 2020-12-18
Payer: COMMERCIAL

## 2020-12-18 VITALS
TEMPERATURE: 97 F | HEIGHT: 69 IN | WEIGHT: 283 LBS | DIASTOLIC BLOOD PRESSURE: 94 MMHG | SYSTOLIC BLOOD PRESSURE: 126 MMHG | HEART RATE: 72 BPM | BODY MASS INDEX: 41.92 KG/M2 | OXYGEN SATURATION: 99 %

## 2020-12-18 PROCEDURE — 99213 OFFICE O/P EST LOW 20 MIN: CPT | Performed by: NURSE PRACTITIONER

## 2020-12-18 RX ORDER — LISINOPRIL 20 MG/1
20 TABLET ORAL DAILY
Qty: 90 TABLET | Refills: 1 | Status: SHIPPED | OUTPATIENT
Start: 2020-12-18 | End: 2021-06-07

## 2020-12-18 ASSESSMENT — ENCOUNTER SYMPTOMS
COUGH: 0
CHEST TIGHTNESS: 0
SHORTNESS OF BREATH: 0
BACK PAIN: 0
PHOTOPHOBIA: 0
ABDOMINAL PAIN: 0
ABDOMINAL DISTENTION: 0
COLOR CHANGE: 0

## 2020-12-18 NOTE — PROGRESS NOTES
Subjective  Chief Complaint   Patient presents with    2 Week Follow-Up     HTN       Hypertension  This is a chronic problem. The current episode started more than 1 year ago. The problem is unchanged. The problem is uncontrolled. Pertinent negatives include no chest pain, headaches, malaise/fatigue, palpitations, peripheral edema or shortness of breath. There are no associated agents to hypertension. Risk factors for coronary artery disease include obesity, post-menopausal state and sedentary lifestyle. Past treatments include ACE inhibitors and beta blockers. The current treatment provides significant improvement. Compliance problems include exercise. There is no history of CAD/MI, heart failure or PVD. Lisinopril was increased at previous visit to 10 mg daily without much change. Bp has been averaging 120s-140s/90s at home. Pt admits she has not been exercising like she used to either.       Past Medical History:   Diagnosis Date    Depression     Depression     GERD (gastroesophageal reflux disease)     Hypertention, malignant, with acute intensive management     PCOS (polycystic ovarian syndrome)      Patient Active Problem List    Diagnosis Date Noted    PCOS (polycystic ovarian syndrome)     GERD (gastroesophageal reflux disease)     Depression     Allergic rhinitis 02/14/2017    Essential hypertension 02/11/2013    PCOD (polycystic ovarian disease) 02/11/2013     Past Surgical History:   Procedure Laterality Date    COLONOSCOPY N/A 10/7/2019    COLONOSCOPY DIAGNOSTIC performed by Teri Melo MD at [facility] [address]  11/12/2019        UPPER GASTROINTESTINAL ENDOSCOPY N/A 10/7/2019    EGD ESOPHAGOGASTRODUODENOSCOPY performed by [facility] at [facility]     Family History   Problem Relation Age of Onset    Diabetes Paternal Grandfather     Hypertension Father     Colon Cancer Father  Diabetes Father     High Blood Pressure Father     Cancer Brother         kidney    High Blood Pressure Brother     No Known Problems Paternal Grandmother     No Known Problems Maternal Grandmother     No Known Problems Maternal Grandfather     High Blood Pressure Mother     Other Mother         leg artery malfunction, causes swelling of the leg    No Known Problems Sister     No Known Problems Other     Breast Cancer Neg Hx     Eclampsia Neg Hx     Ovarian Cancer Neg Hx      Labor Neg Hx     Spont Abortions Neg Hx     Stroke Neg Hx      Social History     Socioeconomic History    Marital status:      Spouse name: None    Number of children: None    Years of education: None    Highest education level: None   Occupational History    None   Social Needs    Financial resource strain: Not hard at all   [address] insecurity     Worry: Never true     Inability: Never true    Transportation needs     Medical: No     Non-medical: No   Tobacco Use    Smoking status: Never Smoker    Smokeless tobacco: Never Used   Substance and Sexual Activity    Alcohol use: Yes     Comment: occ    Drug use: No    Sexual activity: Not Currently   Lifestyle    Physical activity     Days per week: None     Minutes per session: None    Stress: None   Relationships    Social connections     Talks on phone: None     Gets together: None     Attends Rastafari service: None     Active member of club or organization: None     Attends meetings of clubs or organizations: None     Relationship status: None    Intimate partner violence     Fear of current or ex partner: None     Emotionally abused: None     Physically abused: None     Forced sexual activity: None   Other Topics Concern    None   Social History Narrative    None     Current Outpatient Medications on File Prior to Visit   Medication Sig Dispense Refill Appearance: Normal appearance. She is obese. She is not ill-appearing, toxic-appearing or diaphoretic. HENT:      Head: Normocephalic and atraumatic. Right Ear: External ear normal.      Left Ear: External ear normal.   Neck:      Musculoskeletal: Normal range of motion and neck supple. No muscular tenderness. Cardiovascular:      Rate and Rhythm: Normal rate and regular rhythm. Pulses: Normal pulses. Heart sounds: Normal heart sounds. No murmur. No friction rub. No gallop. Pulmonary:      Effort: Pulmonary effort is normal. No respiratory distress. Breath sounds: Normal breath sounds. No stridor. No wheezing, rhonchi or rales. Chest:      Chest wall: No tenderness. Musculoskeletal: Normal range of motion. Right lower leg: No edema. Left lower leg: No edema. Lymphadenopathy:      Cervical: No cervical adenopathy. Skin:     General: Skin is warm and dry. Capillary Refill: Capillary refill takes less than 2 seconds. Coloration: Skin is not jaundiced or pale. Findings: No bruising, erythema, lesion or rash. Neurological:      General: No focal deficit present. Mental Status: She is alert and oriented to person, place, and time. Mental status is at baseline. Cranial Nerves: No cranial nerve deficit. Coordination: Coordination normal.      Gait: Gait normal.   Psychiatric:         Mood and Affect: Mood normal.         Behavior: Behavior normal.         Thought Content: Thought content normal.         Judgment: Judgment normal.         Assessment& Plan    1. Essential hypertension  Patient advised to occasionally monitor blood pressure at home and call office if blood pressure consistently elevated >140/85. Continue with medications as ordered. Watch excess salt intake as it can contribute to elevations in blood pressure. Patient verbalized understanding. Increase lisinopril to 20 mg daily. Increase exercise. F/u in 3 weeks or sooner PRN. - lisinopril (PRINIVIL;ZESTRIL) 20 MG tablet; Take 1 tablet by mouth daily  Dispense: 90 tablet; Refill: 1    Side effects, adverse effects of the medication prescribed today, as well as treatment plan/ rationale and result expectations have been discussed with the patient who expresses understanding and desires to proceed. Close follow up to evaluate treatment results and for coordination of care. I have reviewed the patient's medical history in detail and updated the computerized patient record. As always, patient is advised that if symptoms worsen in any way they will proceed to the nearest emergency room. No orders of the defined types were placed in this encounter. Orders Placed This Encounter   Medications    lisinopril (PRINIVIL;ZESTRIL) 20 MG tablet     Sig: Take 1 tablet by mouth daily     Dispense:  90 tablet     Refill:  1       Return in about 3 weeks (around 1/8/2021) for htn.     GERTRUDE Garrett - CNP

## 2021-06-05 DIAGNOSIS — I10 ESSENTIAL HYPERTENSION: ICD-10-CM

## 2021-06-07 RX ORDER — LISINOPRIL 20 MG/1
TABLET ORAL
Qty: 90 TABLET | Refills: 1 | Status: SHIPPED | OUTPATIENT
Start: 2021-06-07 | End: 2021-10-29 | Stop reason: SDUPTHER

## 2021-10-29 ENCOUNTER — OFFICE VISIT (OUTPATIENT)
Dept: FAMILY MEDICINE CLINIC | Age: 51
End: 2021-10-29
Payer: COMMERCIAL

## 2021-10-29 VITALS
DIASTOLIC BLOOD PRESSURE: 94 MMHG | BODY MASS INDEX: 43.25 KG/M2 | TEMPERATURE: 98.2 F | WEIGHT: 292 LBS | OXYGEN SATURATION: 98 % | HEIGHT: 69 IN | SYSTOLIC BLOOD PRESSURE: 144 MMHG | HEART RATE: 78 BPM

## 2021-10-29 DIAGNOSIS — I10 ESSENTIAL HYPERTENSION: ICD-10-CM

## 2021-10-29 DIAGNOSIS — F32.81 PMDD (PREMENSTRUAL DYSPHORIC DISORDER): ICD-10-CM

## 2021-10-29 DIAGNOSIS — Z23 INFLUENZA VACCINE NEEDED: ICD-10-CM

## 2021-10-29 DIAGNOSIS — Z12.31 ENCOUNTER FOR SCREENING MAMMOGRAM FOR MALIGNANT NEOPLASM OF BREAST: ICD-10-CM

## 2021-10-29 DIAGNOSIS — K21.9 GASTROESOPHAGEAL REFLUX DISEASE WITHOUT ESOPHAGITIS: ICD-10-CM

## 2021-10-29 DIAGNOSIS — R20.0 NUMBNESS OF LEFT LOWER EXTREMITY: ICD-10-CM

## 2021-10-29 DIAGNOSIS — Z13.220 LIPID SCREENING: ICD-10-CM

## 2021-10-29 DIAGNOSIS — Z00.00 ROUTINE GENERAL MEDICAL EXAMINATION AT A HEALTH CARE FACILITY: Primary | ICD-10-CM

## 2021-10-29 LAB
ALBUMIN SERPL-MCNC: 4.4 G/DL (ref 3.5–4.6)
ALP BLD-CCNC: 106 U/L (ref 40–130)
ALT SERPL-CCNC: 24 U/L (ref 0–33)
ANION GAP SERPL CALCULATED.3IONS-SCNC: 12 MEQ/L (ref 9–15)
AST SERPL-CCNC: 24 U/L (ref 0–35)
BILIRUB SERPL-MCNC: 0.6 MG/DL (ref 0.2–0.7)
BUN BLDV-MCNC: 14 MG/DL (ref 6–20)
CALCIUM SERPL-MCNC: 9.7 MG/DL (ref 8.5–9.9)
CHLORIDE BLD-SCNC: 102 MEQ/L (ref 95–107)
CHOLESTEROL, TOTAL: 196 MG/DL (ref 0–199)
CO2: 25 MEQ/L (ref 20–31)
CREAT SERPL-MCNC: 0.93 MG/DL (ref 0.5–0.9)
GFR AFRICAN AMERICAN: >60
GFR NON-AFRICAN AMERICAN: >60
GLOBULIN: 2.4 G/DL (ref 2.3–3.5)
GLUCOSE BLD-MCNC: 99 MG/DL (ref 70–99)
HCT VFR BLD CALC: 44.3 % (ref 37–47)
HDLC SERPL-MCNC: 48 MG/DL (ref 40–59)
HEMOGLOBIN: 14.9 G/DL (ref 12–16)
LDL CHOLESTEROL CALCULATED: 122 MG/DL (ref 0–129)
MCH RBC QN AUTO: 30.6 PG (ref 27–31.3)
MCHC RBC AUTO-ENTMCNC: 33.7 % (ref 33–37)
MCV RBC AUTO: 90.6 FL (ref 82–100)
PDW BLD-RTO: 14 % (ref 11.5–14.5)
PLATELET # BLD: 240 K/UL (ref 130–400)
POTASSIUM SERPL-SCNC: 5 MEQ/L (ref 3.4–4.9)
RBC # BLD: 4.89 M/UL (ref 4.2–5.4)
SODIUM BLD-SCNC: 139 MEQ/L (ref 135–144)
TOTAL PROTEIN: 6.8 G/DL (ref 6.3–8)
TRIGL SERPL-MCNC: 132 MG/DL (ref 0–150)
WBC # BLD: 7.6 K/UL (ref 4.8–10.8)

## 2021-10-29 PROCEDURE — G0438 PPPS, INITIAL VISIT: HCPCS | Performed by: NURSE PRACTITIONER

## 2021-10-29 PROCEDURE — 99214 OFFICE O/P EST MOD 30 MIN: CPT | Performed by: NURSE PRACTITIONER

## 2021-10-29 RX ORDER — IBUPROFEN 800 MG/1
800 TABLET ORAL EVERY 8 HOURS PRN
Qty: 200 TABLET | Refills: 3 | Status: SHIPPED | OUTPATIENT
Start: 2021-10-29 | End: 2022-10-27 | Stop reason: SDUPTHER

## 2021-10-29 RX ORDER — METOPROLOL SUCCINATE 100 MG/1
TABLET, EXTENDED RELEASE ORAL
Qty: 135 TABLET | Refills: 1 | Status: SHIPPED | OUTPATIENT
Start: 2021-10-29 | End: 2022-04-22 | Stop reason: DRUGHIGH

## 2021-10-29 RX ORDER — AMLODIPINE BESYLATE 2.5 MG/1
2.5 TABLET ORAL DAILY
Qty: 30 TABLET | Refills: 0 | Status: SHIPPED | OUTPATIENT
Start: 2021-10-29 | End: 2021-11-16 | Stop reason: SDUPTHER

## 2021-10-29 RX ORDER — OMEPRAZOLE 40 MG/1
CAPSULE, DELAYED RELEASE ORAL
Qty: 90 CAPSULE | Refills: 1 | Status: SHIPPED | OUTPATIENT
Start: 2021-10-29 | End: 2022-10-27 | Stop reason: ALTCHOICE

## 2021-10-29 RX ORDER — LISINOPRIL 20 MG/1
TABLET ORAL
Qty: 90 TABLET | Refills: 1 | Status: SHIPPED | OUTPATIENT
Start: 2021-10-29 | End: 2022-03-24 | Stop reason: ALTCHOICE

## 2021-10-29 ASSESSMENT — PATIENT HEALTH QUESTIONNAIRE - PHQ9
SUM OF ALL RESPONSES TO PHQ9 QUESTIONS 1 & 2: 0
1. LITTLE INTEREST OR PLEASURE IN DOING THINGS: 0
SUM OF ALL RESPONSES TO PHQ QUESTIONS 1-9: 0
2. FEELING DOWN, DEPRESSED OR HOPELESS: 0

## 2021-10-29 ASSESSMENT — LIFESTYLE VARIABLES
HOW OFTEN DURING THE LAST YEAR HAVE YOU NEEDED AN ALCOHOLIC DRINK FIRST THING IN THE MORNING TO GET YOURSELF GOING AFTER A NIGHT OF HEAVY DRINKING: 0
HOW OFTEN DURING THE LAST YEAR HAVE YOU HAD A FEELING OF GUILT OR REMORSE AFTER DRINKING: 0
HOW OFTEN DURING THE LAST YEAR HAVE YOU BEEN UNABLE TO REMEMBER WHAT HAPPENED THE NIGHT BEFORE BECAUSE YOU HAD BEEN DRINKING: 0
HOW OFTEN DO YOU HAVE A DRINK CONTAINING ALCOHOL: 2
HOW OFTEN DO YOU HAVE SIX OR MORE DRINKS ON ONE OCCASION: 0
AUDIT-C TOTAL SCORE: 2
HOW OFTEN DURING THE LAST YEAR HAVE YOU FOUND THAT YOU WERE NOT ABLE TO STOP DRINKING ONCE YOU HAD STARTED: 0
HOW MANY STANDARD DRINKS CONTAINING ALCOHOL DO YOU HAVE ON A TYPICAL DAY: 0
HOW OFTEN DURING THE LAST YEAR HAVE YOU FAILED TO DO WHAT WAS NORMALLY EXPECTED FROM YOU BECAUSE OF DRINKING: 0
AUDIT TOTAL SCORE: 2
HAS A RELATIVE, FRIEND, DOCTOR, OR ANOTHER HEALTH PROFESSIONAL EXPRESSED CONCERN ABOUT YOUR DRINKING OR SUGGESTED YOU CUT DOWN: 0
HAVE YOU OR SOMEONE ELSE BEEN INJURED AS A RESULT OF YOUR DRINKING: 0

## 2021-10-29 NOTE — PATIENT INSTRUCTIONS
Personalized Preventive Plan for Bridgette Murphy - 10/29/2021  Medicare offers a range of preventive health benefits. Some of the tests and screenings are paid in full while other may be subject to a deductible, co-insurance, and/or copay. Some of these benefits include a comprehensive review of your medical history including lifestyle, illnesses that may run in your family, and various assessments and screenings as appropriate. After reviewing your medical record and screening and assessments performed today your provider may have ordered immunizations, labs, imaging, and/or referrals for you. A list of these orders (if applicable) as well as your Preventive Care list are included within your After Visit Summary for your review. Other Preventive Recommendations:    · A preventive eye exam performed by an eye specialist is recommended every 1-2 years to screen for glaucoma; cataracts, macular degeneration, and other eye disorders. · A preventive dental visit is recommended every 6 months. · Try to get at least 150 minutes of exercise per week or 10,000 steps per day on a pedometer . · Order or download the FREE \"Exercise & Physical Activity: Your Everyday Guide\" from [employer]. Call 8-447.670.5775 or search [employer] online. · You need 6659-6708 mg of calcium and 5514-3766 IU of vitamin D per day. It is possible to meet your calcium requirement with diet alone, but a vitamin D supplement is usually necessary to meet this goal.  · When exposed to the sun, use a sunscreen that protects against both UVA and UVB radiation with an SPF of 30 or greater. Reapply every 2 to 3 hours or after sweating, drying off with a towel, or swimming. · Always wear a seat belt when traveling in a car. Always wear a helmet when riding a bicycle or motorcycle.

## 2021-10-29 NOTE — PROGRESS NOTES
Medicare Annual Wellness Visit  Name: Ty Correa Date: 10/29/2021   MRN: [id number] Sex: Female   Age: 46 y.o. Ethnicity: Non- / Non    : 1970 Race: White (non-)      Pina Goff is here for Medicare AWV and Hypertension (states that BP has been running high)    Screenings for behavioral, psychosocial and functional/safety risks, and cognitive dysfunction are all negative except as indicated below. These results, as well as other patient data from the [address] form, are documented in Flowsheets linked to this Encounter. Allergies   Allergen Reactions    Eggs Or Egg-Derived Products Other (See Comments)     congestion    Food      Peanuts tingling of the mouth    Tomato      congestion         Prior to Visit Medications    Medication Sig Taking? Authorizing Provider   omeprazole (PRILOSEC) 40 MG delayed release capsule TAKE 1 CAPSULE DAILY Yes GERTRUDE Verma CNP   metoprolol succinate (TOPROL XL) 100 MG extended release tablet TAKE 1 1/2 TABLET DAILY Yes GERTRUDE Verma CNP   lisinopril (PRINIVIL;ZESTRIL) 20 MG tablet TAKE 1 TABLET DAILY Yes GERTRUDE Verma CNP   ibuprofen (ADVIL;MOTRIN) 800 MG tablet Take 1 tablet by mouth every 8 hours as needed for Pain Yes GERTRUDE Verma CNP   amLODIPine (NORVASC) 2.5 MG tablet Take 1 tablet by mouth daily Yes GERTRUDE Verma CNP   Fluticasone Propionate (FLONASE NA) by Nasal route Yes Historical Provider, MD   Multiple Vitamins-Minerals (CENTRUM ULTRA WOMENS) TABS Take  by mouth.    Yes Historical Provider, MD         Past Medical History:   Diagnosis Date    Depression     Depression     GERD (gastroesophageal reflux disease)     Hypertention, malignant, with acute intensive management     PCOS (polycystic ovarian syndrome)        Past Surgical History:   Procedure Laterality Date    COLONOSCOPY N/A 10/7/2019    COLONOSCOPY DIAGNOSTIC performed by Srini Foley MD at [facility] [address]  2019        UPPER GASTROINTESTINAL ENDOSCOPY N/A 10/7/2019    EGD ESOPHAGOGASTRODUODENOSCOPY performed [facility] MD at [facility]         Family History   Problem Relation Age of Onset    Diabetes Paternal Grandfather     Hypertension Father     Colon Cancer Father     Diabetes Father     High Blood Pressure Father     Cancer Brother         kidney    High Blood Pressure Brother     No Known Problems Paternal Grandmother     No Known Problems Maternal Grandmother     No Known Problems Maternal Grandfather     High Blood Pressure Mother     Other Mother         leg artery malfunction, causes swelling of the leg    No Known Problems Sister     No Known Problems Other     Breast Cancer Neg Hx     Eclampsia Neg Hx     Ovarian Cancer Neg Hx      Labor Neg Hx     Spont Abortions Neg Hx     Stroke Neg Hx        CareTeam (Including outside providers/suppliers regularly involved in providing care):   Patient Care Team:  GERTRUDE Montalvo CNP as PCP - General (Family Medicine)  GERTRUDE Montalvo CNP as PCP - [facility] Empaneled Provider    Wt Readings from Last 3 Encounters:   10/29/21 292 lb (132.5 kg)   20 283 lb (128.4 kg)   20 279 lb (126.6 kg)     Vitals:    10/29/21 0945 10/29/21 0958   BP: (!) 144/94 (!) 144/94   Site: Left Upper Arm    Position: Sitting    Cuff Size: Large Adult    Pulse: 78    Temp: 98.2 °F (36.8 °C)    TempSrc: Tympanic    SpO2: 98%    Weight: 292 lb (132.5 kg)    Height: 5' 9\" (1.753 m)      Body mass index is 43.12 kg/m². Based upon direct observation of the patient, evaluation of cognition reveals recent and remote memory intact.     General Appearance: alert and oriented to person, place and time, well developed and well- nourished, in no acute distress  Skin: warm and dry, no rash or erythema  Head: normocephalic and atraumatic  Eyes: pupils equal, round, and reactive to light, extraocular eye movements intact, conjunctivae normal  ENT: tympanic membrane, external ear and ear canal normal bilaterally, nose without deformity, nasal mucosa and turbinates normal without polyps  Neck: supple and non-tender without mass, no thyromegaly or thyroid nodules, no cervical lymphadenopathy  Pulmonary/Chest: clear to auscultation bilaterally- no wheezes, rales or rhonchi, normal air movement, no respiratory distress  Cardiovascular: normal rate, regular rhythm, normal S1 and S2, no murmurs, rubs, clicks, or gallops, distal pulses intact, no carotid bruits  Extremities: no cyanosis, clubbing or edema  Musculoskeletal: normal range of motion, no joint swelling, deformity or tenderness  Neurologic: reflexes normal and symmetric, no cranial nerve deficit, gait, coordination and speech normal    Patient's complete Health Risk Assessment and screening values have been reviewed and are found in Flowsheets. The following problems were reviewed today and where indicated follow up appointments were made and/or referrals ordered. Positive Risk Factor Screenings with Interventions:            General Health and ACP:  General  In general, how would you say your health is?: Good  In the past 7 days, have you experienced any of the following?  New or Increased Pain, New or Increased Fatigue, Loneliness, Social Isolation, Stress or Anger?: None of These  Do you get the social and emotional support that you need?: Yes  Do you have a Living Will?: (!) No  Advance Directives     Power of  Living Will ACP-Advance Directive ACP-Power of     Not on File Filed on 10/08/19 Filed Not on File      General Health Risk Interventions:  · has info already    Health Habits/Nutrition:  Health Habits/Nutrition  Do you exercise for at least 20 minutes 2-3 times per week?: Yes  Have you lost any weight without trying in the past 3 months?: No  Do you eat only one meal per day?: No  Have you seen the dentist within the past year?: (!) No  Body mass index: (!) 43.12  Health Habits/Nutrition Interventions:  · Dental exam overdue:  patient encouraged to make appointment with his/her dentist       Personalized Preventive Plan   Current Health Maintenance Status  Immunization History   Administered Date(s) Administered    COVID-19, Christophe Lew PF, 30mcg/0.3mL 03/22/2021, 04/13/2021    Influenza, Quadv, IM, (6 mo and older Fluzone, Flulaval, Fluarix and 3 yrs and older Afluria) 10/15/2018, 09/19/2019    Influenza, Quadv, IM, PF (6 mo and older Fluzone, Flulaval, Fluarix, and 3 yrs and older Afluria) 10/07/2020    Zoster Recombinant (Shingrix) 10/07/2020, 12/09/2020        Health Maintenance   Topic Date Due    Hepatitis C screen  Never done    HIV screen  Never done    DTaP/Tdap/Td vaccine (1 - Tdap) Never done    Flu vaccine (1) 09/01/2021    COVID-19 Vaccine (3 - Pfizer booster) 10/13/2021    Potassium monitoring  11/06/2021    Creatinine monitoring  11/06/2021    Breast cancer screen  12/18/2021    Colon cancer screen colonoscopy  10/07/2024    Lipid screen  11/06/2025    Shingles Vaccine  Completed    Hepatitis A vaccine  Aged Out    Hepatitis B vaccine  Aged Out    Hib vaccine  Aged Out    Meningococcal (ACWY) vaccine  Aged Out    Pneumococcal 0-64 years Vaccine  Aged Out     Recommendations for Qgiv Due: see orders and patient instructions/AVS.  . Recommended screening schedule for the next 5-10 years is provided to the patient in written form: see Patient Instructions/AVS.    Sharmila Arnold was seen today for medicare awv and hypertension. Diagnoses and all orders for this visit:    Routine general medical examination at a health care facility    Encounter for screening mammogram for malignant neoplasm of breast  -     BLAYNE DIGITAL SCREEN W OR WO CAD BILATERAL;  Future    Gastroesophageal reflux disease without esophagitis  - omeprazole (PRILOSEC) 40 MG delayed release capsule; TAKE 1 CAPSULE DAILY    Essential hypertension  -     metoprolol succinate (TOPROL XL) 100 MG extended release tablet; TAKE 1 1/2 TABLET DAILY  -     lisinopril (PRINIVIL;ZESTRIL) 20 MG tablet; TAKE 1 TABLET DAILY  -     amLODIPine (NORVASC) 2.5 MG tablet; Take 1 tablet by mouth daily  -     CBC; Future  -     Comprehensive Metabolic Panel; Future    PMDD (premenstrual dysphoric disorder)  -     ibuprofen (ADVIL;MOTRIN) 800 MG tablet; Take 1 tablet by mouth every 8 hours as needed for Pain    Influenza vaccine needed    Numbness of left lower extremity  -     EMG; Future    Lipid screening  -     Lipid Panel; Future             Numbness on left side of leg when she stands up, had for a long but getting worse over the last 6 months. Denies any back pain. Mom was diagnosed with PAD, wondering if this is related to that. HTN-brought in bp readings from home. Systolic has been good at 107-110s.; however diastolic has been averaging high 80s-low 90s. Add on amlodipine 2.5 mg to current regimen. Continue to monitor bp at home. F/u in 3 weeks or sooner PRN.

## 2021-11-16 ENCOUNTER — OFFICE VISIT (OUTPATIENT)
Dept: FAMILY MEDICINE CLINIC | Age: 51
End: 2021-11-16
Payer: COMMERCIAL

## 2021-11-16 VITALS
SYSTOLIC BLOOD PRESSURE: 122 MMHG | DIASTOLIC BLOOD PRESSURE: 86 MMHG | HEIGHT: 69 IN | BODY MASS INDEX: 43.4 KG/M2 | HEART RATE: 90 BPM | TEMPERATURE: 98.2 F | OXYGEN SATURATION: 98 % | WEIGHT: 293 LBS

## 2021-11-16 DIAGNOSIS — I10 ESSENTIAL HYPERTENSION: Primary | ICD-10-CM

## 2021-11-16 PROCEDURE — 99213 OFFICE O/P EST LOW 20 MIN: CPT | Performed by: NURSE PRACTITIONER

## 2021-11-16 RX ORDER — AMLODIPINE BESYLATE 2.5 MG/1
2.5 TABLET ORAL DAILY
Qty: 30 TABLET | Refills: 3 | Status: SHIPPED | OUTPATIENT
Start: 2021-11-16 | End: 2021-12-13 | Stop reason: SDUPTHER

## 2021-11-16 SDOH — ECONOMIC STABILITY: FOOD INSECURITY: WITHIN THE PAST 12 MONTHS, THE FOOD YOU BOUGHT JUST DIDN'T LAST AND YOU DIDN'T HAVE MONEY TO GET MORE.: NEVER TRUE

## 2021-11-16 SDOH — ECONOMIC STABILITY: FOOD INSECURITY: WITHIN THE PAST 12 MONTHS, YOU WORRIED THAT YOUR FOOD WOULD RUN OUT BEFORE YOU GOT MONEY TO BUY MORE.: NEVER TRUE

## 2021-11-16 ASSESSMENT — SOCIAL DETERMINANTS OF HEALTH (SDOH): HOW HARD IS IT FOR YOU TO PAY FOR THE VERY BASICS LIKE FOOD, HOUSING, MEDICAL CARE, AND HEATING?: NOT HARD AT ALL

## 2021-11-16 ASSESSMENT — ENCOUNTER SYMPTOMS
TROUBLE SWALLOWING: 0
CHEST TIGHTNESS: 0
CONSTIPATION: 0
DIARRHEA: 0
SHORTNESS OF BREATH: 0
EYE PAIN: 0
COUGH: 0
ABDOMINAL PAIN: 0
COLOR CHANGE: 0
BACK PAIN: 0

## 2021-11-16 NOTE — PROGRESS NOTES
Subjective  Chief Complaint   Patient presents with    Hypertension     3 week f/u    Flu Vaccine     allergic to eggs, asked for flu vaccine       Hypertension  This is a chronic problem. The current episode started more than 1 year ago. The problem has been gradually improving since onset. The problem is controlled. Pertinent negatives include no chest pain, malaise/fatigue, palpitations, peripheral edema or shortness of breath. There are no associated agents to hypertension. Risk factors for coronary artery disease include obesity. Past treatments include calcium channel blockers, ACE inhibitors and beta blockers. The current treatment provides significant improvement. There are no compliance problems. There is no history of CAD/MI, heart failure or PVD. There is no history of chronic renal disease, a hypertension causing med or a thyroid problem.        Past Medical History:   Diagnosis Date    Depression     Depression     GERD (gastroesophageal reflux disease)     Hypertention, malignant, with acute intensive management     PCOS (polycystic ovarian syndrome)      Patient Active Problem List    Diagnosis Date Noted    PCOS (polycystic ovarian syndrome)     GERD (gastroesophageal reflux disease)     Depression     Allergic rhinitis 02/14/2017    Essential hypertension 02/11/2013    PCOD (polycystic ovarian disease) 02/11/2013     Past Surgical History:   Procedure Laterality Date    COLONOSCOPY N/A 10/7/2019    COLONOSCOPY DIAGNOSTIC performed by Jericho Peres MD at [facility] [address]  11/12/2019        UPPER GASTROINTESTINAL ENDOSCOPY N/A 10/7/2019    EGD ESOPHAGOGASTRODUODENOSCOPY performed by Jerich[facility]res [facility] at [facility]     Family History   Problem Relation Age of Onset    Diabetes Paternal Grandfather     Hypertension Father     Colon Cancer Father     Diabetes Father     High Blood Pressure Father     Cancer Brother         kidney    High Blood Pressure Brother     No Known Problems Paternal Grandmother     No Known Problems Maternal Grandmother     No Known Problems Maternal Grandfather     High Blood Pressure Mother     Other Mother         leg artery malfunction, causes swelling of the leg    No Known Problems Sister     No Known Problems Other     Breast Cancer Neg Hx     Eclampsia Neg Hx     Ovarian Cancer Neg Hx      Labor Neg Hx     Spont Abortions Neg Hx     Stroke Neg Hx      Social History     Socioeconomic History    Marital status:      Spouse name: None    Number of children: None    Years of education: None    Highest education level: None   Occupational History    None   Tobacco Use    Smoking status: Never Smoker    Smokeless tobacco: Never Used   Vaping Use    Vaping Use: Never used   Substance and Sexual Activity    Alcohol use: Yes     Comment: occ    Drug use: No    Sexual activity: Not Currently   Other Topics Concern    None   Social History Narrative    None     Social Determinants of Health     Financial Resource Strain: Low Risk     Difficulty of Paying Living Expenses: Not hard at all   Food Insecurity: No Food Insecurity    Worried About Running Out of Food in the Last Year: Never true    Kiesha of Food in the Last Year: Never true   Transportation Needs:     Lack of Transportation (Medical): Not on file    Lack of Transportation (Non-Medical):  Not on file   Physical Activity:     Days of Exercise per Week: Not on file    Minutes of Exercise per Session: Not on file   Stress:     Feeling of Stress : Not on file   Social Connections:     Frequency of Communication with Friends and Family: Not on file    Frequency of Social Gatherings with Friends and Family: Not on file    Attends Episcopal Services: Not on file    Active Member of Clubs or Organizations: Not on file    Attends Club or Organization Meetings: Not on file    Marital Status: Not on file   Intimate Partner Violence:     Fear of Current or Ex-Partner: Not on file    Emotionally Abused: Not on file    Physically Abused: Not on file    Sexually Abused: Not on file   Housing Stability:     Unable to Pay for Housing in the Last Year: Not on file    Number of [place] in the Last Year: Not on file    Unstable Housing in the Last Year: Not on file     Current Outpatient Medications on File Prior to Visit   Medication Sig Dispense Refill    omeprazole (PRILOSEC) 40 MG delayed release capsule TAKE 1 CAPSULE DAILY 90 capsule 1    metoprolol succinate (TOPROL XL) 100 MG extended release tablet TAKE 1 1/2 TABLET DAILY 135 tablet 1    lisinopril (PRINIVIL;ZESTRIL) 20 MG tablet TAKE 1 TABLET DAILY 90 tablet 1    ibuprofen (ADVIL;MOTRIN) 800 MG tablet Take 1 tablet by mouth every 8 hours as needed for Pain 200 tablet 3    Fluticasone Propionate (FLONASE NA) by Nasal route      Multiple Vitamins-Minerals (CENTRUM ULTRA WOMENS) TABS Take  by mouth. No current facility-administered medications on file prior to visit. Allergies   Allergen Reactions    Eggs Or Egg-Derived Products Other (See Comments)     congestion    Food      Peanuts tingling of the mouth    Tomato      congestion       Review of Systems   Constitutional: Negative for activity change, appetite change, chills, diaphoresis, fatigue, fever and malaise/fatigue. HENT: Negative for congestion, ear pain, hearing loss and trouble swallowing. Eyes: Negative for pain and visual disturbance. Respiratory: Negative for cough, chest tightness and shortness of breath. Cardiovascular: Negative for chest pain, palpitations and leg swelling. Gastrointestinal: Negative for abdominal pain, constipation and diarrhea. Endocrine: Negative for polydipsia, polyphagia and polyuria. Genitourinary: Negative for difficulty urinating and dysuria. Musculoskeletal: Negative for arthralgias and back pain.    Skin: Negative for color change and rash. Neurological: Negative for dizziness and light-headedness. Psychiatric/Behavioral: Negative for dysphoric mood. The patient is not nervous/anxious. Objective  Vitals:    11/16/21 1414   BP: 122/86   Site: Right Upper Arm   Position: Sitting   Cuff Size: Large Adult   Pulse: 90   Temp: 98.2 °F (36.8 °C)   TempSrc: Infrared   SpO2: 98%   Weight: 295 lb (133.8 kg)   Height: 5' 9\" (1.753 m)     Physical Exam  Constitutional:       General: She is not in acute distress. Appearance: Normal appearance. She is obese. She is not ill-appearing, toxic-appearing or diaphoretic. HENT:      Head: Normocephalic and atraumatic. Right Ear: External ear normal.      Left Ear: External ear normal.      Nose: Nose normal. No congestion or rhinorrhea. Eyes:      Extraocular Movements: Extraocular movements intact. Conjunctiva/sclera: Conjunctivae normal.      Pupils: Pupils are equal, round, and reactive to light. Cardiovascular:      Rate and Rhythm: Normal rate and regular rhythm. Pulses: Normal pulses. Heart sounds: Normal heart sounds. No murmur heard. Pulmonary:      Effort: Pulmonary effort is normal. No respiratory distress. Breath sounds: Normal breath sounds. No stridor. No wheezing, rhonchi or rales. Chest:      Chest wall: No tenderness. Musculoskeletal:         General: Normal range of motion. Cervical back: Normal range of motion and neck supple. No tenderness. Right lower leg: No edema. Left lower leg: No edema. Lymphadenopathy:      Cervical: No cervical adenopathy. Skin:     General: Skin is warm. Capillary Refill: Capillary refill takes less than 2 seconds. Coloration: Skin is not jaundiced. Findings: No erythema or lesion. Neurological:      General: No focal deficit present. Mental Status: She is alert and oriented to person, place, and time. Mental status is at baseline.       Cranial Nerves: No cranial nerve deficit. Coordination: Coordination normal.      Gait: Gait normal.   Psychiatric:         Mood and Affect: Mood normal.         Behavior: Behavior normal.         Thought Content: Thought content normal.         Judgment: Judgment normal.         Assessment& Plan    1. Essential hypertension  Continue current regimen. F/u in 3 months or sooner PRN. Side effects, adverse effects of the medication prescribed today, as well as treatment plan/ rationale and result expectations have been discussed with the patient who expresses understanding and desires to proceed. Close follow up to evaluate treatment results and for coordination of care. I have reviewed the patient's medical history in detail and updated the computerized patient record. As always, patient is advised that if symptoms worsen in any way they will proceed to the nearest emergency room. No orders of the defined types were placed in this encounter. Orders Placed This Encounter   Medications    amLODIPine (NORVASC) 2.5 MG tablet     Sig: Take 1 tablet by mouth daily     Dispense:  30 tablet     Refill:  3       Medications Discontinued During This Encounter   Medication Reason    amLODIPine (NORVASC) 2.5 MG tablet REORDER       Return in about 3 months (around 2/16/2022) for htn.     Chriss Rosas, APRN - CNP

## 2021-12-13 DIAGNOSIS — I10 ESSENTIAL HYPERTENSION: ICD-10-CM

## 2021-12-14 RX ORDER — AMLODIPINE BESYLATE 2.5 MG/1
2.5 TABLET ORAL DAILY
Qty: 90 TABLET | Refills: 1 | Status: SHIPPED | OUTPATIENT
Start: 2021-12-14 | End: 2022-03-24 | Stop reason: DRUGHIGH

## 2022-01-21 ENCOUNTER — TELEPHONE (OUTPATIENT)
Dept: FAMILY MEDICINE CLINIC | Age: 52
End: 2022-01-21

## 2022-03-24 ENCOUNTER — OFFICE VISIT (OUTPATIENT)
Dept: FAMILY MEDICINE CLINIC | Age: 52
End: 2022-03-24
Payer: COMMERCIAL

## 2022-03-24 VITALS
DIASTOLIC BLOOD PRESSURE: 86 MMHG | WEIGHT: 293 LBS | HEART RATE: 84 BPM | HEIGHT: 69 IN | OXYGEN SATURATION: 98 % | TEMPERATURE: 98.2 F | BODY MASS INDEX: 43.4 KG/M2 | SYSTOLIC BLOOD PRESSURE: 126 MMHG

## 2022-03-24 DIAGNOSIS — Z12.31 ENCOUNTER FOR SCREENING MAMMOGRAM FOR MALIGNANT NEOPLASM OF BREAST: ICD-10-CM

## 2022-03-24 DIAGNOSIS — I10 ESSENTIAL HYPERTENSION: Primary | ICD-10-CM

## 2022-03-24 DIAGNOSIS — R20.0 LEFT LEG NUMBNESS: ICD-10-CM

## 2022-03-24 PROCEDURE — 99214 OFFICE O/P EST MOD 30 MIN: CPT | Performed by: NURSE PRACTITIONER

## 2022-03-24 RX ORDER — AMLODIPINE BESYLATE 5 MG/1
5 TABLET ORAL DAILY
Qty: 90 TABLET | Refills: 0 | Status: SHIPPED | OUTPATIENT
Start: 2022-03-24 | End: 2022-06-22 | Stop reason: SDUPTHER

## 2022-03-24 ASSESSMENT — ENCOUNTER SYMPTOMS
ABDOMINAL PAIN: 0
TROUBLE SWALLOWING: 0
COUGH: 0
EYE PAIN: 0
CONSTIPATION: 0
CHEST TIGHTNESS: 0
DIARRHEA: 0
COLOR CHANGE: 0
SHORTNESS OF BREATH: 0
BACK PAIN: 0

## 2022-03-24 NOTE — PROGRESS NOTES
Subjective  Chief Complaint   Patient presents with    Hypertension     3 mos check up    Depression    Gastroesophageal Reflux       Hypertension  This is a chronic problem. The current episode started more than 1 year ago. The problem is unchanged. The problem is controlled. Pertinent negatives include no chest pain, malaise/fatigue, palpitations, peripheral edema or shortness of breath. There are no associated agents to hypertension. Risk factors for coronary artery disease include obesity and post-menopausal state. Past treatments include ACE inhibitors, calcium channel blockers and beta blockers. The current treatment provides significant improvement. There are no compliance problems. There is no history of CAD/MI, heart failure or PVD. There is no history of a hypertension causing med or a thyroid problem. Pt recently had EMG ordered for left leg numbness but did not get it done. Symptoms have improved, but are still there intermittently so she would still like to go ahead with the test.    Also asking about \"condensing\" medications given that she is on so many. Was started on lisinopril initially without improvement in bp so norvasc was added on which did significantly improve her bp at that time. Depression-doing very well , not on any medications at this time.      Past Medical History:   Diagnosis Date    Depression     Depression     GERD (gastroesophageal reflux disease)     Hypertention, malignant, with acute intensive management     PCOS (polycystic ovarian syndrome)      Patient Active Problem List    Diagnosis Date Noted    PCOS (polycystic ovarian syndrome)     GERD (gastroesophageal reflux disease)     Depression     Allergic rhinitis 02/14/2017    Essential hypertension 02/11/2013    PCOD (polycystic ovarian disease) 02/11/2013     Past Surgical History:   Procedure Laterality Date    COLONOSCOPY N/A 10/7/2019    COLONOSCOPY DIAGNOSTIC performed [place] Joe Millard [place] [facility] [place] [address] [place]    DILATION AND CURETTAGE      HYSTEROSCOPY      OVARY REMOVAL  2019        UPPER GASTROINTESTINAL ENDOSCOPY N/A 10/7/2019    EGD ESOPHAGOGASTRODUODENOSCOPY performed by Omi Rodriguez [facility] at [facility]     Family History   Problem Relation Age of Onset    Diabetes Paternal Grandfather     Hypertension Father     Colon Cancer Father     Diabetes Father     High Blood Pressure Father     Cancer Brother         kidney    High Blood Pressure Brother     No Known Problems Paternal Grandmother     No Known Problems Maternal Grandmother     No Known Problems Maternal Grandfather     High Blood Pressure Mother     Other Mother         leg artery malfunction, causes swelling of the leg    No Known Problems Sister     No Known Problems Other     Breast Cancer Neg Hx     Eclampsia Neg Hx     Ovarian Cancer Neg Hx      Labor Neg Hx     Spont Abortions Neg Hx     Stroke Neg Hx      Social History     Socioeconomic History    Marital status:      Spouse name: None    Number of children: None    Years of education: None    Highest education level: None   Occupational History    None   Tobacco Use    Smoking status: Never Smoker    Smokeless tobacco: Never Used   Vaping Use    Vaping Use: Never used   Substance and Sexual Activity    Alcohol use: Yes     Comment: occ    Drug use: No    Sexual activity: Not Currently   Other Topics Concern    None   Social History Narrative    None     Social Determinants of Health     Financial Resource Strain: Low Risk     Difficulty of Paying Living Expenses: Not hard at all   Food Insecurity: No Food Insecurity    Worried About Running Out of Food in the Last Year: Never true    Kiesha of Food in the Last Year: Never true   Transportation Needs:     Lack of Transportation (Medical): Not on file    Lack of Transportation (Non-Medical):  Not on file   Physical Activity:     Days of Exercise per Week: Not on file  Minutes of Exercise per Session: Not on file   Stress:     Feeling of Stress : Not on file   Social Connections:     Frequency of Communication with Friends and Family: Not on file    Frequency of Social Gatherings with Friends and Family: Not on file    Attends Alevism Services: Not on file    Active Member of [address] [place] [address] or Organizations: Not on file    Attends Club or Organization Meetings: Not on file    Marital Status: Not on file   Intimate Partner Violence:     Fear of Current or Ex-Partner: Not on file    Emotionally Abused: Not on file    Physically Abused: Not on file    Sexually Abused: Not on file   Housing Stability:     Unable to Pay for Housing in the Last Year: Not on file    Number of [place] in the Last Year: Not on file    Unstable Housing in the Last Year: Not on file     Current Outpatient Medications on File Prior to Visit   Medication Sig Dispense Refill    omeprazole (PRILOSEC) 40 MG delayed release capsule TAKE 1 CAPSULE DAILY 90 capsule 1    metoprolol succinate (TOPROL XL) 100 MG extended release tablet TAKE 1 1/2 TABLET DAILY 135 tablet 1    ibuprofen (ADVIL;MOTRIN) 800 MG tablet Take 1 tablet by mouth every 8 hours as needed for Pain 200 tablet 3    Fluticasone Propionate (FLONASE NA) by Nasal route      Multiple Vitamins-Minerals (CENTRUM ULTRA WOMENS) TABS Take  by mouth. No current facility-administered medications on file prior to visit. Allergies   Allergen Reactions    Eggs Or Egg-Derived Products Other (See Comments)     congestion    Food      Peanuts tingling of the mouth    Tomato      congestion       Review of Systems   Constitutional: Negative for activity change, appetite change, chills, diaphoresis, fatigue, fever and malaise/fatigue. HENT: Negative for congestion, ear pain, hearing loss and trouble swallowing. Eyes: Negative for pain and visual disturbance.    Respiratory: Negative for cough, chest tightness and shortness of breath. Cardiovascular: Negative for chest pain, palpitations and leg swelling. Gastrointestinal: Negative for abdominal pain, constipation and diarrhea. Endocrine: Negative for polydipsia, polyphagia and polyuria. Genitourinary: Negative for difficulty urinating and dysuria. Musculoskeletal: Negative for arthralgias and back pain. Skin: Negative for color change and rash. Neurological: Negative for dizziness and light-headedness. Psychiatric/Behavioral: Negative for dysphoric mood. The patient is not nervous/anxious. Objective  Vitals:    03/24/22 1608   BP: 126/86   Site: Left Upper Arm   Position: Sitting   Cuff Size: Large Adult   Pulse: 84   Temp: 98.2 °F (36.8 °C)   TempSrc: Infrared   SpO2: 98%   Weight: 294 lb (133.4 kg)   Height: 5' 9\" (1.753 m)     Physical Exam  Constitutional:       General: She is not in acute distress. Appearance: Normal appearance. She is obese. She is not ill-appearing, toxic-appearing or diaphoretic. HENT:      Head: Normocephalic and atraumatic. Right Ear: External ear normal.      Left Ear: External ear normal.      Nose: Nose normal. No congestion or rhinorrhea. Eyes:      Extraocular Movements: Extraocular movements intact. Conjunctiva/sclera: Conjunctivae normal.      Pupils: Pupils are equal, round, and reactive to light. Cardiovascular:      Rate and Rhythm: Normal rate and regular rhythm. Pulses: Normal pulses. Heart sounds: Normal heart sounds. No murmur heard. Pulmonary:      Effort: Pulmonary effort is normal. No respiratory distress. Breath sounds: Normal breath sounds. No stridor. No wheezing, rhonchi or rales. Chest:      Chest wall: No tenderness. Musculoskeletal:         General: Normal range of motion. Cervical back: Normal range of motion and neck supple. No tenderness. Right lower leg: No edema. Left lower leg: No edema. Lymphadenopathy:      Cervical: No cervical adenopathy. Skin:     General: Skin is warm. Capillary Refill: Capillary refill takes less than 2 seconds. Coloration: Skin is not jaundiced. Findings: No erythema or lesion. Neurological:      General: No focal deficit present. Mental Status: She is alert and oriented to person, place, and time. Mental status is at baseline. Cranial Nerves: No cranial nerve deficit. Coordination: Coordination normal.      Gait: Gait normal.   Psychiatric:         Mood and Affect: Mood normal.         Behavior: Behavior normal.         Thought Content: Thought content normal.         Judgment: Judgment normal.         Assessment& Plan     Diagnosis Orders   1. Essential hypertension  amLODIPine (NORVASC) 5 MG tablet   2. Encounter for screening mammogram for malignant neoplasm of breast  BLAYNE DIGITAL SCREEN W OR WO CAD BILATERAL   3. Left leg numbness  EMG     Stop lisinopril. Increase norvasc to 5 mg daily. May need to increase to 10 mg if bp goes high. Reorder blayne and emg. F/u in 3 weeks or sooner PRN. Side effects, adverse effects of the medication prescribed today, as well as treatment plan/ rationale and result expectations have been discussed with the patient who expresses understanding and desires to proceed. Close follow up to evaluate treatment results and for coordination of care. I have reviewed the patient's medical history in detail and updated the computerized patient record. As always, patient is advised that if symptoms worsen in any way they will proceed to the nearest emergency room. Orders Placed This Encounter   Procedures    BLAYNE DIGITAL SCREEN W OR WO CAD BILATERAL     Standing Status:   Future     Standing Expiration Date:   5/24/2023     Order Specific Question:   Reason for exam:     Answer:   breast cancer screening    EMG     Standing Status:   Future     Standing Expiration Date:   5/23/2022     Order Specific Question:   Which body part?      Answer:   left leg Orders Placed This Encounter   Medications    amLODIPine (NORVASC) 5 MG tablet     Sig: Take 1 tablet by mouth daily     Dispense:  90 tablet     Refill:  0       Medications Discontinued During This Encounter   Medication Reason    lisinopril (PRINIVIL;ZESTRIL) 20 MG tablet Alternate therapy    amLODIPine (NORVASC) 2.5 MG tablet DOSE ADJUSTMENT       Return in about 3 weeks (around 4/14/2022) for htn.     Abel Mendoza, APRN - CNP

## 2022-04-22 ENCOUNTER — OFFICE VISIT (OUTPATIENT)
Dept: FAMILY MEDICINE CLINIC | Age: 52
End: 2022-04-22
Payer: COMMERCIAL

## 2022-04-22 VITALS
OXYGEN SATURATION: 98 % | HEIGHT: 69 IN | HEART RATE: 91 BPM | BODY MASS INDEX: 43.4 KG/M2 | SYSTOLIC BLOOD PRESSURE: 116 MMHG | DIASTOLIC BLOOD PRESSURE: 92 MMHG | WEIGHT: 293 LBS | TEMPERATURE: 98.2 F

## 2022-04-22 DIAGNOSIS — I10 ESSENTIAL HYPERTENSION: ICD-10-CM

## 2022-04-22 PROCEDURE — 99214 OFFICE O/P EST MOD 30 MIN: CPT | Performed by: NURSE PRACTITIONER

## 2022-04-22 RX ORDER — METOPROLOL SUCCINATE 100 MG/1
TABLET, EXTENDED RELEASE ORAL
Qty: 135 TABLET | Refills: 1 | Status: SHIPPED
Start: 2022-04-22 | End: 2022-06-22 | Stop reason: SDUPTHER

## 2022-04-22 ASSESSMENT — ENCOUNTER SYMPTOMS
TROUBLE SWALLOWING: 0
COLOR CHANGE: 0
COUGH: 0
SHORTNESS OF BREATH: 0
DIARRHEA: 0
EYE PAIN: 0
CONSTIPATION: 0
BACK PAIN: 0
CHEST TIGHTNESS: 0
ABDOMINAL PAIN: 0

## 2022-04-22 NOTE — PROGRESS NOTES
Subjective  Chief Complaint   Patient presents with    Hypertension     1 month follow up       HPI    HTN-discontinued lisinopril at last visit, bp has been stable since that time. Asking to go down dose on metoprolol so she no longer has to split the pills each day. Is feeling very well, checking bp at home regularly and bp and heart rate have been normal (110s/70s and HR 70s-80s).      Past Medical History:   Diagnosis Date    Depression     Depression     GERD (gastroesophageal reflux disease)     Hypertention, malignant, with acute intensive management     PCOS (polycystic ovarian syndrome)      Patient Active Problem List    Diagnosis Date Noted    PCOS (polycystic ovarian syndrome)     GERD (gastroesophageal reflux disease)     Depression     Allergic rhinitis 2017    Essential hypertension 2013    PCOD (polycystic ovarian disease) 2013     Past Surgical History:   Procedure Laterality Date    COLONOSCOPY N/A 10/7/2019    COLONOSCOPY DIAGNOSTIC performed by Ana Silverio MD at [facility] [address]  2019        UPPER GASTROINTESTINAL ENDOSCOPY N/A 10/7/2019    EGD ESOPHAGOGASTRODUODENOSCOPY performed by Ana [facility]io MD at [facility]     Family History   Problem Relation Age of Onset    Diabetes Paternal Grandfather     Hypertension Father     Colon Cancer Father     Diabetes Father     High Blood Pressure Father     Cancer Brother         kidney    High Blood Pressure Brother     No Known Problems Paternal Grandmother     No Known Problems Maternal Grandmother     No Known Problems Maternal Grandfather     High Blood Pressure Mother     Other Mother         leg artery malfunction, causes swelling of the leg    No Known Problems Sister     No Known Problems Other     Breast Cancer Neg Hx     Eclampsia Neg Hx     Ovarian Cancer Neg Hx      Labor Neg Hx     Spont Abortions Neg Hx  Stroke Neg Hx      Social History     Socioeconomic History    Marital status:      Spouse name: None    Number of children: None    Years of education: None    Highest education level: None   Occupational History    None   Tobacco Use    Smoking status: Never Smoker    Smokeless tobacco: Never Used   Vaping Use    Vaping Use: Never used   Substance and Sexual Activity    Alcohol use: Yes     Comment: occ    Drug use: No    Sexual activity: Not Currently   Other Topics Concern    None   Social History Narrative    None     Social Determinants of Health     Financial Resource Strain: Low Risk     Difficulty of Paying Living Expenses: Not hard at all   Food Insecurity: No Food Insecurity    Worried About Running Out of Food in the Last Year: Never true    Kiesha of Food in the Last Year: Never true   Transportation Needs:     Lack of Transportation (Medical): Not on file    Lack of Transportation (Non-Medical):  Not on file   Physical Activity:     Days of Exercise per Week: Not on file    Minutes of Exercise per Session: Not on file   Stress:     Feeling of Stress : Not on file   Social Connections:     Frequency of Communication with Friends and Family: Not on file    Frequency of Social Gatherings with Friends and Family: Not on file    Attends Yazidi Services: Not on file    Active Member of [address] [place] [address] or Organizations: Not on file    Attends Club or Organization Meetings: Not on file    Marital Status: Not on file   Intimate Partner Violence:     Fear of Current or Ex-Partner: Not on file    Emotionally Abused: Not on file    Physically Abused: Not on file    Sexually Abused: Not on file   Housing Stability:     Unable to Pay for Housing in the Last Year: Not on file    Number of [place] in the Last Year: Not on file    Unstable Housing in the Last Year: Not on file     Current Outpatient Medications on File Prior to Visit   Medication Sig Dispense Refill    amLODIPine (NORVASC) 5 MG tablet Take 1 tablet by mouth daily 90 tablet 0    omeprazole (PRILOSEC) 40 MG delayed release capsule TAKE 1 CAPSULE DAILY 90 capsule 1    ibuprofen (ADVIL;MOTRIN) 800 MG tablet Take 1 tablet by mouth every 8 hours as needed for Pain 200 tablet 3    Fluticasone Propionate (FLONASE NA) by Nasal route      Multiple Vitamins-Minerals (CENTRUM ULTRA WOMENS) TABS Take  by mouth. No current facility-administered medications on file prior to visit. Allergies   Allergen Reactions    Eggs Or Egg-Derived Products Other (See Comments)     congestion    Food      Peanuts tingling of the mouth    Tomato      congestion       Review of Systems   Constitutional: Negative for activity change, appetite change, chills, diaphoresis, fatigue and fever. HENT: Negative for congestion, ear pain, hearing loss and trouble swallowing. Eyes: Negative for pain and visual disturbance. Respiratory: Negative for cough, chest tightness and shortness of breath. Cardiovascular: Negative for chest pain, palpitations and leg swelling. Gastrointestinal: Negative for abdominal pain, constipation and diarrhea. Endocrine: Negative for polydipsia, polyphagia and polyuria. Genitourinary: Negative for difficulty urinating and dysuria. Musculoskeletal: Negative for arthralgias and back pain. Skin: Negative for color change and rash. Neurological: Negative for dizziness and light-headedness. Psychiatric/Behavioral: Negative for dysphoric mood. The patient is not nervous/anxious. Objective  Vitals:    04/22/22 1604 04/22/22 1609   BP: (!) 118/92 (!) 116/92   Site: Right Upper Arm    Position: Sitting    Cuff Size: Large Adult    Pulse: 91    Temp: 98.2 °F (36.8 °C)    TempSrc: Infrared    SpO2: 98%    Weight: 294 lb (133.4 kg)    Height: 5' 9\" (1.753 m)      Physical Exam  Constitutional:       General: She is not in acute distress. Appearance: Normal appearance. She is obese.  She is not ill-appearing, toxic-appearing or diaphoretic. HENT:      Head: Normocephalic and atraumatic. Right Ear: External ear normal.      Left Ear: External ear normal.      Nose: Nose normal. No congestion or rhinorrhea. Eyes:      Extraocular Movements: Extraocular movements intact. Conjunctiva/sclera: Conjunctivae normal.      Pupils: Pupils are equal, round, and reactive to light. Cardiovascular:      Rate and Rhythm: Normal rate and regular rhythm. Pulses: Normal pulses. Heart sounds: Normal heart sounds. No murmur heard. Pulmonary:      Effort: Pulmonary effort is normal. No respiratory distress. Breath sounds: Normal breath sounds. No stridor. No wheezing, rhonchi or rales. Chest:      Chest wall: No tenderness. Musculoskeletal:         General: Normal range of motion. Cervical back: Normal range of motion and neck supple. No tenderness. Right lower leg: No edema. Left lower leg: No edema. Lymphadenopathy:      Cervical: No cervical adenopathy. Skin:     General: Skin is warm. Capillary Refill: Capillary refill takes less than 2 seconds. Coloration: Skin is not jaundiced. Findings: No erythema or lesion. Neurological:      General: No focal deficit present. Mental Status: She is alert and oriented to person, place, and time. Mental status is at baseline. Cranial Nerves: No cranial nerve deficit. Coordination: Coordination normal.      Gait: Gait normal.   Psychiatric:         Mood and Affect: Mood normal.         Behavior: Behavior normal.         Thought Content: Thought content normal.         Judgment: Judgment normal.         Assessment& Plan    1. Essential hypertension  Decrease metoprolol to 100 mg daily. Continue current regimen otherwise. Continue to monitor bp. Send update via FANCRU in 2 weeks. F/u in 1 month. If bp goes up, will increase norvasc to 10 mg daily.   - metoprolol succinate (TOPROL XL) 100 MG extended release tablet; TAKE 1 tablet daily  Dispense: 135 tablet; Refill: 1    Side effects, adverse effects of the medication prescribed today, as well as treatment plan/ rationale and result expectations have been discussed with the patient who expresses understanding and desires to proceed. Close follow up to evaluate treatment results and for coordination of care. I have reviewed the patient's medical history in detail and updated the computerized patient record. As always, patient is advised that if symptoms worsen in any way they will proceed to the nearest emergency room. No orders of the defined types were placed in this encounter. Orders Placed This Encounter   Medications    metoprolol succinate (TOPROL XL) 100 MG extended release tablet     Sig: TAKE 1 tablet daily     Dispense:  135 tablet     Refill:  1       Medications Discontinued During This Encounter   Medication Reason    metoprolol succinate (TOPROL XL) 100 MG extended release tablet DOSE ADJUSTMENT       Return in about 4 weeks (around 5/20/2022) for htn.     GERTRUDE Hernandez - CNP

## 2022-06-22 DIAGNOSIS — I10 ESSENTIAL HYPERTENSION: ICD-10-CM

## 2022-06-22 NOTE — TELEPHONE ENCOUNTER
Future Appointments    Encounter Information    Provider Department Appt Notes   7/15/2022 Alisha Kelley MD EMG pt to carry sw pt    7/15/2022 [address] ar[address] Hoskins [address] 54 epic   pt       Past Visits    Date Provider Specialty Visit Type Primary Dx   04/22/2022 GERTRUDE Roberson CNP Family Medicine Office Visit Essential hypertension   03/24/2022 GERTRUDE Roberson CNP Family Medicine Office Visit Essential hypertension   11/16/2021 GERTRUDE Roberson CNP Family Medicine Office Visit Essential hypertension

## 2022-06-23 RX ORDER — METOPROLOL SUCCINATE 100 MG/1
TABLET, EXTENDED RELEASE ORAL
Qty: 135 TABLET | Refills: 1 | Status: SHIPPED | OUTPATIENT
Start: 2022-06-23 | End: 2022-10-27 | Stop reason: SDUPTHER

## 2022-06-23 RX ORDER — AMLODIPINE BESYLATE 5 MG/1
5 TABLET ORAL DAILY
Qty: 90 TABLET | Refills: 1 | Status: SHIPPED | OUTPATIENT
Start: 2022-06-23 | End: 2022-10-27 | Stop reason: SDUPTHER

## 2022-07-15 ENCOUNTER — HOSPITAL ENCOUNTER (OUTPATIENT)
Dept: NEUROLOGY | Age: 52
Discharge: HOME | End: 2022-07-15
Payer: COMMERCIAL

## 2022-07-15 ENCOUNTER — HOSPITAL ENCOUNTER (OUTPATIENT)
Dept: WOMENS IMAGING | Age: 52
Discharge: HOME | End: 2022-07-17
Payer: COMMERCIAL

## 2022-07-15 VITALS — BODY MASS INDEX: 43.42 KG/M2 | HEIGHT: 69 IN

## 2022-07-15 DIAGNOSIS — R20.0 NUMBNESS: ICD-10-CM

## 2022-07-15 DIAGNOSIS — Z12.31 ENCOUNTER FOR SCREENING MAMMOGRAM FOR MALIGNANT NEOPLASM OF BREAST: ICD-10-CM

## 2022-07-15 PROCEDURE — 77063 BREAST TOMOSYNTHESIS BI: CPT

## 2022-07-15 PROCEDURE — 95886 MUSC TEST DONE W/N TEST COMP: CPT

## 2022-07-15 PROCEDURE — 95910 NRV CNDJ TEST 7-8 STUDIES: CPT

## 2022-07-15 NOTE — PROCEDURES
Deanne [address]er [address]                      [address], [address] [facility]                             ELECTROMYOGRAM REPORT    PATIENT NAME: Winsome Lopez                 :        1970  MED REC NO:   [id number]                            ROOM:  ACCOUNT NO:   [de-identified]                           ADMIT DATE: 07/15/2022  PROVIDER:     Nesha Brown MD    DATE OF EM/15/2022    REFERRING PROVIDER:  Iliana Durand NP.    REASON FOR STUDY:  The patient was having numbness in the left thigh on  the anterolateral aspect. FINDINGS:  Motor nerve conduction velocities are normal in all the  nerves tested. F-wave latencies are normal in the superficial peroneal nerves, but  delayed in the tibial nerves bilaterally. Distal motor latencies are normal in all the nerves tested. Distal sensory latencies are delayed in the right sural nerve and normal  in other nerves tested. Amplitudes of motor and sensory responses are decreased in many of the  nerve segments tested. On concentric needle electrode examination, mild denervation changes are  present in the L5 root distribution bilaterally. Mild denervation  changes are seen in the extensor digitorum brevis muscles also  bilaterally. CLINICAL INTERPRETATION:  EMG studies are showing changes of mild  bilateral L5 radiculopathy. The patient does not have symptoms of  lumbar radiculopathy at this time. The patient is developing changes of mild peripheral neuropathy. She  has a positive family history of diabetes and needs to be watched. If  clinically indicated, other causes of peripheral neuropathy could also  be looked into such as exposure to toxins, autoimmune disorder,  hypothyroidism, etc.    For the paresthesias due to meralgia paresthetica, she could be tried on  medications such as topiramate, gabapentin, Trileptal, etc.    If clinically indicated, we could repeat the study in a year.     Thank you Ms. Florentino Pineda for allowing me to see this patient. Please feel  free to call me if I can be of any further assistance regarding this  patient's evaluation.         Theresa Candelaria MD    D: 07/15/2022 16:12:49       T: 07/15/2022 16:16:14     [email or web address]  Job#: [id number]     Doc#: [id number]    CC:

## 2022-10-27 ENCOUNTER — OFFICE VISIT (OUTPATIENT)
Dept: FAMILY MEDICINE CLINIC | Age: 52
End: 2022-10-27
Payer: COMMERCIAL

## 2022-10-27 VITALS
HEIGHT: 69 IN | BODY MASS INDEX: 43.4 KG/M2 | WEIGHT: 293 LBS | HEART RATE: 96 BPM | OXYGEN SATURATION: 95 % | DIASTOLIC BLOOD PRESSURE: 86 MMHG | SYSTOLIC BLOOD PRESSURE: 124 MMHG

## 2022-10-27 DIAGNOSIS — Z13.220 LIPID SCREENING: ICD-10-CM

## 2022-10-27 DIAGNOSIS — F32.81 PMDD (PREMENSTRUAL DYSPHORIC DISORDER): ICD-10-CM

## 2022-10-27 DIAGNOSIS — I10 ESSENTIAL HYPERTENSION: Primary | ICD-10-CM

## 2022-10-27 DIAGNOSIS — G57.12 MERALGIA PARAESTHETICA, LEFT: ICD-10-CM

## 2022-10-27 PROCEDURE — 3074F SYST BP LT 130 MM HG: CPT | Performed by: NURSE PRACTITIONER

## 2022-10-27 PROCEDURE — 99214 OFFICE O/P EST MOD 30 MIN: CPT | Performed by: NURSE PRACTITIONER

## 2022-10-27 PROCEDURE — 3078F DIAST BP <80 MM HG: CPT | Performed by: NURSE PRACTITIONER

## 2022-10-27 RX ORDER — IBUPROFEN 800 MG/1
800 TABLET ORAL EVERY 8 HOURS PRN
Qty: 200 TABLET | Refills: 3 | Status: SHIPPED | OUTPATIENT
Start: 2022-10-27

## 2022-10-27 RX ORDER — AMLODIPINE BESYLATE 10 MG/1
10 TABLET ORAL DAILY
Qty: 90 TABLET | Refills: 1 | Status: SHIPPED | OUTPATIENT
Start: 2022-10-27

## 2022-10-27 RX ORDER — METOPROLOL SUCCINATE 100 MG/1
100 TABLET, EXTENDED RELEASE ORAL DAILY
Qty: 90 TABLET | Refills: 1 | Status: SHIPPED | OUTPATIENT
Start: 2022-10-27

## 2022-10-27 ASSESSMENT — PATIENT HEALTH QUESTIONNAIRE - PHQ9
8. MOVING OR SPEAKING SO SLOWLY THAT OTHER PEOPLE COULD HAVE NOTICED. OR THE OPPOSITE, BEING SO FIGETY OR RESTLESS THAT YOU HAVE BEEN MOVING AROUND A LOT MORE THAN USUAL: 0
SUM OF ALL RESPONSES TO PHQ QUESTIONS 1-9: 1
SUM OF ALL RESPONSES TO PHQ QUESTIONS 1-9: 1
6. FEELING BAD ABOUT YOURSELF - OR THAT YOU ARE A FAILURE OR HAVE LET YOURSELF OR YOUR FAMILY DOWN: 0
2. FEELING DOWN, DEPRESSED OR HOPELESS: 0
SUM OF ALL RESPONSES TO PHQ QUESTIONS 1-9: 1
1. LITTLE INTEREST OR PLEASURE IN DOING THINGS: 0
4. FEELING TIRED OR HAVING LITTLE ENERGY: 1
9. THOUGHTS THAT YOU WOULD BE BETTER OFF DEAD, OR OF HURTING YOURSELF: 0
7. TROUBLE CONCENTRATING ON THINGS, SUCH AS READING THE NEWSPAPER OR WATCHING TELEVISION: 0
10. IF YOU CHECKED OFF ANY PROBLEMS, HOW DIFFICULT HAVE THESE PROBLEMS MADE IT FOR YOU TO DO YOUR WORK, TAKE CARE OF THINGS AT HOME, OR GET ALONG WITH OTHER PEOPLE: 0
5. POOR APPETITE OR OVEREATING: 0
SUM OF ALL RESPONSES TO PHQ QUESTIONS 1-9: 1
3. TROUBLE FALLING OR STAYING ASLEEP: 0
SUM OF ALL RESPONSES TO PHQ9 QUESTIONS 1 & 2: 0

## 2022-10-27 ASSESSMENT — ENCOUNTER SYMPTOMS
TROUBLE SWALLOWING: 0
COLOR CHANGE: 0
ABDOMINAL PAIN: 0
EYE PAIN: 0
BACK PAIN: 0
SHORTNESS OF BREATH: 0
COUGH: 0
CONSTIPATION: 0
DIARRHEA: 0
CHEST TIGHTNESS: 0

## 2022-10-27 NOTE — PROGRESS NOTES
Subjective  Chief Complaint   Patient presents with    Results     Pt is present to go over EMG results. Hypertension  This is a chronic problem. The current episode started more than 1 year ago. The problem is unchanged. The problem is controlled. Pertinent negatives include no chest pain, headaches, malaise/fatigue, palpitations, peripheral edema or shortness of breath. There are no associated agents to hypertension. Risk factors for coronary artery disease include obesity and post-menopausal state. Past treatments include beta blockers and calcium channel blockers. The current treatment provides significant improvement. There are no compliance problems. There is no history of CAD/MI, heart failure or PVD. There is no history of chronic renal disease, a hypertension causing med or a thyroid problem. Discuss EMG results. EMG showed meralgia paresthetica, pt is completely asymptomatic at this time. States it really does not bother her and would prefer to not treat medically if not necessary. Due for labs. Will need pap next year.     Past Medical History:   Diagnosis Date    Depression     Depression     GERD (gastroesophageal reflux disease)     Hypertention, malignant, with acute intensive management     PCOS (polycystic ovarian syndrome)      Patient Active Problem List    Diagnosis Date Noted    PCOS (polycystic ovarian syndrome)     GERD (gastroesophageal reflux disease)     Depression     Allergic rhinitis 02/14/2017    Essential hypertension 02/11/2013    PCOD (polycystic ovarian disease) 02/11/2013     Past Surgical History:   Procedure Laterality Date    COLONOSCOPY N/A 10/7/2019    COLONOSCOPY DIAGNOSTIC performed by Jonelle Avendaño MD at [address] [place] [address]  11/12/2019        UPPER GASTROINTESTINAL ENDOSCOPY N/A 10/7/2019    EGD ESOPHAGOGASTRODUODENOSCOPY performed by Jonelle Avendaño MD at [facility]     Family History Problem Relation Age of Onset    Diabetes Paternal Grandfather     Hypertension Father     Colon Cancer Father     Diabetes Father     High Blood Pressure Father     Cancer Brother         kidney    High Blood Pressure Brother     No Known Problems Paternal Grandmother     No Known Problems Maternal Grandmother     No Known Problems Maternal Grandfather     High Blood Pressure Mother     Other Mother         leg artery malfunction, causes swelling of the leg    No Known Problems Sister     No Known Problems Other     Breast Cancer Neg Hx     Eclampsia Neg Hx     Ovarian Cancer Neg Hx      Labor Neg Hx     Spont Abortions Neg Hx     Stroke Neg Hx      Social History     Socioeconomic History    Marital status:      Spouse name: None    Number of children: None    Years of education: None    Highest education level: None   Tobacco Use    Smoking status: Never    Smokeless tobacco: Never   Vaping Use    Vaping Use: Never used   Substance and Sexual Activity    Alcohol use: Yes     Comment: occ    Drug use: No    Sexual activity: Not Currently     Social Determinants of Health     Financial Resource Strain: Low Risk     Difficulty of Paying Living Expenses: Not hard at all   Food Insecurity: No Food Insecurity    Worried About Running Out of Food in the Last Year: Never true    Ran Out of Food in the Last Year: Never true     Current Outpatient Medications on File Prior to Visit   Medication Sig Dispense Refill    CETIRIZINE HCL PO       Fluticasone Propionate (FLONASE NA) by Nasal route      Multiple Vitamins-Minerals (CENTRUM ULTRA WOMENS) TABS Take  by mouth. No current facility-administered medications on file prior to visit.      Allergies   Allergen Reactions    Eggs Or Egg-Derived Products Other (See Comments)     congestion    Food      Peanuts tingling of the mouth    Tomato      congestion       Review of Systems   Constitutional:  Negative for activity change, appetite change, chills, diaphoresis, fatigue, fever and malaise/fatigue. HENT:  Negative for congestion, ear pain, hearing loss and trouble swallowing. Eyes:  Negative for pain and visual disturbance. Respiratory:  Negative for cough, chest tightness and shortness of breath. Cardiovascular:  Negative for chest pain, palpitations and leg swelling. Gastrointestinal:  Negative for abdominal pain, constipation and diarrhea. Endocrine: Negative for polydipsia, polyphagia and polyuria. Genitourinary:  Negative for difficulty urinating and dysuria. Musculoskeletal:  Negative for arthralgias and back pain. Skin:  Negative for color change and rash. Neurological:  Negative for dizziness, light-headedness and headaches. Psychiatric/Behavioral:  Negative for dysphoric mood. The patient is not nervous/anxious. Objective  Vitals:    10/27/22 1458   BP: 124/86   Site: Right Upper Arm   Position: Sitting   Cuff Size: Large Adult   Pulse: 96   SpO2: 95%   Weight: 294 lb (133.4 kg)   Height: 5' 9\" (1.753 m)     Physical Exam  Constitutional:       General: She is not in acute distress. Appearance: Normal appearance. She is obese. She is not ill-appearing, toxic-appearing or diaphoretic. HENT:      Head: Normocephalic and atraumatic. Right Ear: External ear normal.      Left Ear: External ear normal.      Nose: Nose normal. No congestion or rhinorrhea. Eyes:      Extraocular Movements: Extraocular movements intact. Conjunctiva/sclera: Conjunctivae normal.      Pupils: Pupils are equal, round, and reactive to light. Cardiovascular:      Rate and Rhythm: Normal rate and regular rhythm. Pulses: Normal pulses. Heart sounds: Normal heart sounds. No murmur heard. Pulmonary:      Effort: Pulmonary effort is normal. No respiratory distress. Breath sounds: Normal breath sounds. No stridor. No wheezing, rhonchi or rales. Chest:      Chest wall: No tenderness.    Musculoskeletal:         General: Normal range of motion. Cervical back: Normal range of motion and neck supple. No tenderness. Right lower leg: No edema. Left lower leg: No edema. Lymphadenopathy:      Cervical: No cervical adenopathy. Skin:     General: Skin is warm. Capillary Refill: Capillary refill takes less than 2 seconds. Coloration: Skin is not jaundiced. Findings: No erythema or lesion. Neurological:      General: No focal deficit present. Mental Status: She is alert and oriented to person, place, and time. Mental status is at baseline. Cranial Nerves: No cranial nerve deficit. Coordination: Coordination normal.      Gait: Gait normal.   Psychiatric:         Mood and Affect: Mood normal.         Behavior: Behavior normal.         Thought Content: Thought content normal.         Judgment: Judgment normal.       Assessment& Plan     Diagnosis Orders   1. Essential hypertension  amLODIPine (NORVASC) 10 MG tablet    metoprolol succinate (TOPROL XL) 100 MG extended release tablet    CBC    Comprehensive Metabolic Panel      2. PMDD (premenstrual dysphoric disorder)  ibuprofen (ADVIL;MOTRIN) 800 MG tablet      3. Lipid screening  Lipid Panel      4. Meralgia paraesthetica, left          HTN well controlled so continue same. Meralgia paraesthetica asymptomatic, discussed weight loss and loose fitting clothing. Check labs. F/u in 6 months or sooner PRN. Side effects, adverse effects of the medication prescribed today, as well as treatment plan/ rationale and result expectations have been discussed with the patient who expresses understanding and desires to proceed. Close follow up to evaluate treatment results and for coordination of care. I have reviewed the patient's medical history in detail and updated the computerized patient record. As always, patient is advised that if symptoms worsen in any way they will proceed to the nearest emergency room.        Orders Placed This Encounter Procedures    CBC     Standing Status:   Future     Standing Expiration Date:   10/27/2023    Comprehensive Metabolic Panel     Standing Status:   Future     Standing Expiration Date:   10/27/2023    Lipid Panel     Standing Status:   Future     Standing Expiration Date:   10/27/2023     Order Specific Question:   Is Patient Fasting?/# of Hours     Answer:   10       Orders Placed This Encounter   Medications    amLODIPine (NORVASC) 10 MG tablet     Sig: Take 1 tablet by mouth daily     Dispense:  90 tablet     Refill:  1    ibuprofen (ADVIL;MOTRIN) 800 MG tablet     Sig: Take 1 tablet by mouth every 8 hours as needed for Pain     Dispense:  200 tablet     Refill:  3    metoprolol succinate (TOPROL XL) 100 MG extended release tablet     Sig: Take 1 tablet by mouth daily TAKE 1 tablet daily     Dispense:  90 tablet     Refill:  1       Medications Discontinued During This Encounter   Medication Reason    omeprazole (PRILOSEC) 40 MG delayed release capsule Therapy completed    ibuprofen (ADVIL;MOTRIN) 800 MG tablet REORDER    amLODIPine (NORVASC) 5 MG tablet REORDER    metoprolol succinate (TOPROL XL) 100 MG extended release tablet REORDER       Return in about 6 months (around 4/27/2023) for htn.     Arpit Juarez, APRN - CNP

## 2022-11-10 DIAGNOSIS — I10 ESSENTIAL HYPERTENSION: ICD-10-CM

## 2022-11-10 DIAGNOSIS — Z13.220 LIPID SCREENING: ICD-10-CM

## 2022-11-10 LAB
ALBUMIN SERPL-MCNC: 4.1 G/DL (ref 3.5–4.6)
ALP BLD-CCNC: 96 U/L (ref 40–130)
ALT SERPL-CCNC: 30 U/L (ref 0–33)
ANION GAP SERPL CALCULATED.3IONS-SCNC: 10 MEQ/L (ref 9–15)
AST SERPL-CCNC: 32 U/L (ref 0–35)
BILIRUB SERPL-MCNC: 0.6 MG/DL (ref 0.2–0.7)
BUN BLDV-MCNC: 13 MG/DL (ref 6–20)
CALCIUM SERPL-MCNC: 9.8 MG/DL (ref 8.5–9.9)
CHLORIDE BLD-SCNC: 100 MEQ/L (ref 95–107)
CHOLESTEROL, TOTAL: 201 MG/DL (ref 0–199)
CO2: 28 MEQ/L (ref 20–31)
CREAT SERPL-MCNC: 0.98 MG/DL (ref 0.5–0.9)
GFR SERPL CREATININE-BSD FRML MDRD: >60 ML/MIN/{1.73_M2}
GLOBULIN: 3.1 G/DL (ref 2.3–3.5)
GLUCOSE BLD-MCNC: 94 MG/DL (ref 70–99)
HCT VFR BLD CALC: 45.5 % (ref 37–47)
HDLC SERPL-MCNC: 54 MG/DL (ref 40–59)
HEMOGLOBIN: 15 G/DL (ref 12–16)
LDL CHOLESTEROL CALCULATED: 113 MG/DL (ref 0–129)
MCH RBC QN AUTO: 30.5 PG (ref 27–31.3)
MCHC RBC AUTO-ENTMCNC: 33.1 % (ref 33–37)
MCV RBC AUTO: 92.2 FL (ref 79.4–94.8)
PDW BLD-RTO: 14.1 % (ref 11.5–14.5)
PLATELET # BLD: 249 K/UL (ref 130–400)
POTASSIUM SERPL-SCNC: 4.8 MEQ/L (ref 3.4–4.9)
RBC # BLD: 4.93 M/UL (ref 4.2–5.4)
SODIUM BLD-SCNC: 138 MEQ/L (ref 135–144)
TOTAL PROTEIN: 7.2 G/DL (ref 6.3–8)
TRIGL SERPL-MCNC: 170 MG/DL (ref 0–150)
WBC # BLD: 8.2 K/UL (ref 4.8–10.8)

## 2023-04-19 ENCOUNTER — TELEPHONE (OUTPATIENT)
Dept: FAMILY MEDICINE CLINIC | Age: 53
End: 2023-04-19

## 2023-04-19 NOTE — TELEPHONE ENCOUNTER
----- Message from [facility] sent at 4/19/2023 10:54 AM EDT -----  Subject: Appointment Request    Reason for Call: Established Patient Appointment needed: New Patient   Request Appointment    QUESTIONS    Reason for appointment request? Other - Wanting to switch providers due to   insurance      Additional Information for Provider? Patient's insurance no longer covers   Pietro Ramos so patient is wanting to become established with Kimberly Muñoz, as she is in network with her plan. Please call to advise patient   if this is possible and to schedule. She is hoping to schedule the   appointment on 05/09 at 4:30 if allowed.    ---------------------------------------------------------------------------  --------------  Alexia Stuart [facility]  [phone]; OK to leave message on [employer],OK to respond with electronic   message via [facility] portal (only for patients who have registered [facility]   account)  ---------------------------------------------------------------------------  --------------  SCRIPT ANSWERS  COVID Screen: Elisa Chamberlain

## 2023-04-28 ENCOUNTER — TELEPHONE (OUTPATIENT)
Dept: FAMILY MEDICINE CLINIC | Age: 53
End: 2023-04-28

## 2023-04-28 NOTE — TELEPHONE ENCOUNTER
Pt calling stating that KR is not in network with her [facility] plan of [id number]. Pt was concerned that she would not be able to be seen. I called [facility] and spoke with a Eladio Memory whom at first stated that KR was not in network, I asked if Dr. Pee Chang was in network,  Eladio Memory stated that Dr. Pee Chang was in network but JUNE was not, I advised that they both were in the same office and company. Eladio Memory had corrected this and KR is now in network. Ref Number is [id number].

## 2023-05-12 DIAGNOSIS — I10 ESSENTIAL HYPERTENSION: ICD-10-CM

## 2023-05-15 RX ORDER — AMLODIPINE BESYLATE 10 MG/1
TABLET ORAL
Qty: 90 TABLET | Refills: 1 | Status: SHIPPED | OUTPATIENT
Start: 2023-05-15

## 2023-06-05 DIAGNOSIS — I10 ESSENTIAL HYPERTENSION: ICD-10-CM

## 2023-06-05 RX ORDER — METOPROLOL SUCCINATE 100 MG/1
TABLET, EXTENDED RELEASE ORAL
Qty: 90 TABLET | Refills: 0 | Status: SHIPPED | OUTPATIENT
Start: 2023-06-05

## 2023-06-05 NOTE — TELEPHONE ENCOUNTER
Comments: [employer] message sent to f/u    Last Office Visit (last PCP visit):   10/27/2022    Next Visit Date:  No future appointments. **If hasn't been seen in over a year OR hasn't followed up according to last diabetes/ADHD visit, make appointment for patient before sending refill to provider.     Rx requested:  Requested Prescriptions     Pending Prescriptions Disp Refills    metoprolol succinate (TOPROL XL) 100 MG extended release tablet [Pharmacy Med Name: METOPROL SUC TAB 100MG ER] 90 tablet 1     Sig: TAKE 1 TABLET DAILY

## 2023-09-11 DIAGNOSIS — I10 ESSENTIAL HYPERTENSION: ICD-10-CM

## 2023-09-12 NOTE — TELEPHONE ENCOUNTER
Comments:     Last Office Visit (last PCP visit):   10/27/2022    Next Visit Date:  Future Appointments   Date Time Provider [address]   10/10/2023  2:45 PM GERTRUDE Olivera CNP [facility] [place]       **If hasn't been seen in over a year OR hasn't followed up according to last diabetes/ADHD visit, make appointment for patient before sending refill to provider.     Rx requested:  Requested Prescriptions     Pending Prescriptions Disp Refills    metoprolol succinate (TOPROL XL) 100 MG extended release tablet [Pharmacy Med Name: METOPROL SUC TAB 100MG ER] 90 tablet 0     Sig: TAKE 1 TABLET DAILY

## 2023-09-13 RX ORDER — METOPROLOL SUCCINATE 100 MG/1
TABLET, EXTENDED RELEASE ORAL
Qty: 90 TABLET | Refills: 0 | Status: SHIPPED | OUTPATIENT
Start: 2023-09-13

## 2023-10-10 ENCOUNTER — OFFICE VISIT (OUTPATIENT)
Dept: FAMILY MEDICINE CLINIC | Age: 53
End: 2023-10-10
Payer: COMMERCIAL

## 2023-10-10 VITALS
SYSTOLIC BLOOD PRESSURE: 132 MMHG | HEART RATE: 107 BPM | DIASTOLIC BLOOD PRESSURE: 86 MMHG | WEIGHT: 293 LBS | HEIGHT: 69 IN | BODY MASS INDEX: 43.4 KG/M2 | OXYGEN SATURATION: 96 %

## 2023-10-10 DIAGNOSIS — Z13.220 LIPID SCREENING: ICD-10-CM

## 2023-10-10 DIAGNOSIS — Z23 INFLUENZA VACCINE NEEDED: ICD-10-CM

## 2023-10-10 DIAGNOSIS — G89.29 CHRONIC PAIN OF RIGHT ANKLE: ICD-10-CM

## 2023-10-10 DIAGNOSIS — I10 ESSENTIAL HYPERTENSION: Primary | ICD-10-CM

## 2023-10-10 DIAGNOSIS — Z12.4 CERVICAL CANCER SCREENING: ICD-10-CM

## 2023-10-10 DIAGNOSIS — M25.571 CHRONIC PAIN OF RIGHT ANKLE: ICD-10-CM

## 2023-10-10 PROCEDURE — 3079F DIAST BP 80-89 MM HG: CPT | Performed by: NURSE PRACTITIONER

## 2023-10-10 PROCEDURE — 90471 IMMUNIZATION ADMIN: CPT | Performed by: NURSE PRACTITIONER

## 2023-10-10 PROCEDURE — 99214 OFFICE O/P EST MOD 30 MIN: CPT | Performed by: NURSE PRACTITIONER

## 2023-10-10 PROCEDURE — 3075F SYST BP GE 130 - 139MM HG: CPT | Performed by: NURSE PRACTITIONER

## 2023-10-10 PROCEDURE — 90674 CCIIV4 VAC NO PRSV 0.5 ML IM: CPT | Performed by: NURSE PRACTITIONER

## 2023-10-10 RX ORDER — AMLODIPINE BESYLATE 10 MG/1
10 TABLET ORAL DAILY
Qty: 90 TABLET | Refills: 3 | Status: SHIPPED | OUTPATIENT
Start: 2023-10-10

## 2023-10-10 RX ORDER — METOPROLOL SUCCINATE 100 MG/1
100 TABLET, EXTENDED RELEASE ORAL DAILY
Qty: 90 TABLET | Refills: 3 | Status: SHIPPED | OUTPATIENT
Start: 2023-10-10

## 2023-10-10 SDOH — ECONOMIC STABILITY: INCOME INSECURITY: HOW HARD IS IT FOR YOU TO PAY FOR THE VERY BASICS LIKE FOOD, HOUSING, MEDICAL CARE, AND HEATING?: NOT HARD AT ALL

## 2023-10-10 SDOH — ECONOMIC STABILITY: FOOD INSECURITY: WITHIN THE PAST 12 MONTHS, YOU WORRIED THAT YOUR FOOD WOULD RUN OUT BEFORE YOU GOT MONEY TO BUY MORE.: NEVER TRUE

## 2023-10-10 SDOH — ECONOMIC STABILITY: FOOD INSECURITY: WITHIN THE PAST 12 MONTHS, THE FOOD YOU BOUGHT JUST DIDN'T LAST AND YOU DIDN'T HAVE MONEY TO GET MORE.: NEVER TRUE

## 2023-10-10 SDOH — ECONOMIC STABILITY: HOUSING INSECURITY
IN THE LAST 12 MONTHS, WAS THERE A TIME WHEN YOU DID NOT HAVE A STEADY PLACE TO SLEEP OR SLEPT IN A SHELTER (INCLUDING NOW)?: NO

## 2023-10-10 ASSESSMENT — ENCOUNTER SYMPTOMS
TROUBLE SWALLOWING: 0
EYE PAIN: 0
ABDOMINAL PAIN: 1
CONSTIPATION: 0
DIARRHEA: 0
SHORTNESS OF BREATH: 0
CHEST TIGHTNESS: 0
BACK PAIN: 0
COLOR CHANGE: 0
COUGH: 0

## 2023-10-10 ASSESSMENT — PATIENT HEALTH QUESTIONNAIRE - PHQ9
SUM OF ALL RESPONSES TO PHQ QUESTIONS 1-9: 1
6. FEELING BAD ABOUT YOURSELF - OR THAT YOU ARE A FAILURE OR HAVE LET YOURSELF OR YOUR FAMILY DOWN: 0
4. FEELING TIRED OR HAVING LITTLE ENERGY: 1
10. IF YOU CHECKED OFF ANY PROBLEMS, HOW DIFFICULT HAVE THESE PROBLEMS MADE IT FOR YOU TO DO YOUR WORK, TAKE CARE OF THINGS AT HOME, OR GET ALONG WITH OTHER PEOPLE: 0
SUM OF ALL RESPONSES TO PHQ QUESTIONS 1-9: 1
SUM OF ALL RESPONSES TO PHQ QUESTIONS 1-9: 1
9. THOUGHTS THAT YOU WOULD BE BETTER OFF DEAD, OR OF HURTING YOURSELF: 0
SUM OF ALL RESPONSES TO PHQ9 QUESTIONS 1 & 2: 0
7. TROUBLE CONCENTRATING ON THINGS, SUCH AS READING THE NEWSPAPER OR WATCHING TELEVISION: 0
8. MOVING OR SPEAKING SO SLOWLY THAT OTHER PEOPLE COULD HAVE NOTICED. OR THE OPPOSITE, BEING SO FIGETY OR RESTLESS THAT YOU HAVE BEEN MOVING AROUND A LOT MORE THAN USUAL: 0
SUM OF ALL RESPONSES TO PHQ QUESTIONS 1-9: 1
1. LITTLE INTEREST OR PLEASURE IN DOING THINGS: 0
3. TROUBLE FALLING OR STAYING ASLEEP: 0
2. FEELING DOWN, DEPRESSED OR HOPELESS: 0
5. POOR APPETITE OR OVEREATING: 0

## 2023-10-10 NOTE — PROGRESS NOTES
After obtaining consent, and per orders of Eliane SMALL , injection of flu vaccine was given in Left deltoid by Jillian Ricci, [address]. Patient instructed to remain in clinic for 20 minutes afterwards, and to report any adverse reaction to me immediately. Tolerated well.
Vaccine Information Sheet, \"Influenza - Inactivated\"  given to Gabriella Kearney, or parent/legal guardian of  Gabriella Kearney and verbalized understanding. Patient responses:    Have you ever had a reaction to a flu vaccine? No  Are you able to eat eggs without adverse effects? Yes  Do you have any current illness? No  Have you ever had Guillian [place] Syndrome? No    Flu vaccine given per order. Please see immunization tab.
Cervical back: Normal range of motion and neck supple. No tenderness. Right lower leg: No edema. Left lower leg: No edema. Lymphadenopathy:      Cervical: No cervical adenopathy. Skin:     General: Skin is warm and dry. Capillary Refill: Capillary refill takes less than 2 seconds. Coloration: Skin is not jaundiced or pale. Findings: No bruising, erythema, lesion or rash. Neurological:      General: No focal deficit present. Mental Status: She is alert and oriented to person, place, and time. Mental status is at baseline. Cranial Nerves: No cranial nerve deficit. Coordination: Coordination normal.      Gait: Gait normal.   Psychiatric:         Mood and Affect: Mood normal.         Speech: Speech normal.         Behavior: Behavior normal.         Thought Content: Thought content normal.         Judgment: Judgment normal.         Assessment& Plan     Diagnosis Orders   1. Essential hypertension  amLODIPine (NORVASC) 10 MG tablet    metoprolol succinate (TOPROL XL) 100 MG extended release tablet    CBC    Comprehensive Metabolic Panel      2. Influenza vaccine needed  Influenza, FLUCELVAX, (age 10 mo+), IM, Preservative Free, 0.5 mL      3. Cervical cancer screening  [address], [address], [address]      4. Lipid screening  Lipid Panel      5. Chronic pain of right ankle  Ambulatory referral to Physical Therapy        HTN well controlled so continue same. Check labs. Referral to PT. Probiotics recommended. Discussed low likelihood of diverticulitis given duration, lack of fever, absence of tenderness and clinical improvement. If no improvement in 2 weeks will order CT abd/pelvis. F/u in 1 year or sooner PRN. Side effects, adverse effects of the medication prescribed today, as well as treatment plan/ rationale and result expectations have been discussed with the patient who expresses understanding and desires to proceed.     Close follow up to evaluate

## 2023-10-13 DIAGNOSIS — I10 ESSENTIAL HYPERTENSION: ICD-10-CM

## 2023-10-13 DIAGNOSIS — Z13.220 LIPID SCREENING: ICD-10-CM

## 2023-10-13 LAB
ALBUMIN SERPL-MCNC: 4.4 G/DL (ref 3.5–4.6)
ALP SERPL-CCNC: 101 U/L (ref 40–130)
ALT SERPL-CCNC: 45 U/L (ref 0–33)
ANION GAP SERPL CALCULATED.3IONS-SCNC: 13 MEQ/L (ref 9–15)
AST SERPL-CCNC: 46 U/L (ref 0–35)
BILIRUB SERPL-MCNC: 0.6 MG/DL (ref 0.2–0.7)
BUN SERPL-MCNC: 13 MG/DL (ref 6–20)
CALCIUM SERPL-MCNC: 9.8 MG/DL (ref 8.5–9.9)
CHLORIDE SERPL-SCNC: 102 MEQ/L (ref 95–107)
CHOLEST SERPL-MCNC: 202 MG/DL (ref 0–199)
CO2 SERPL-SCNC: 23 MEQ/L (ref 20–31)
CREAT SERPL-MCNC: 0.9 MG/DL (ref 0.5–0.9)
ERYTHROCYTE [DISTWIDTH] IN BLOOD BY AUTOMATED COUNT: 13.6 % (ref 11.5–14.5)
GLOBULIN SER CALC-MCNC: 3.3 G/DL (ref 2.3–3.5)
GLUCOSE SERPL-MCNC: 93 MG/DL (ref 70–99)
HCT VFR BLD AUTO: 48.8 % (ref 37–47)
HDLC SERPL-MCNC: 46 MG/DL (ref 40–59)
HGB BLD-MCNC: 15.6 G/DL (ref 12–16)
LDLC SERPL CALC-MCNC: 123 MG/DL (ref 0–129)
MCH RBC QN AUTO: 29.9 PG (ref 27–31.3)
MCHC RBC AUTO-ENTMCNC: 32 % (ref 33–37)
MCV RBC AUTO: 93.7 FL (ref 79.4–94.8)
PLATELET # BLD AUTO: 295 K/UL (ref 130–400)
POTASSIUM SERPL-SCNC: 5 MEQ/L (ref 3.4–4.9)
PROT SERPL-MCNC: 7.7 G/DL (ref 6.3–8)
RBC # BLD AUTO: 5.21 M/UL (ref 4.2–5.4)
SODIUM SERPL-SCNC: 138 MEQ/L (ref 135–144)
TRIGL SERPL-MCNC: 167 MG/DL (ref 0–150)
WBC # BLD AUTO: 7.5 K/UL (ref 4.8–10.8)

## 2023-11-08 DIAGNOSIS — I10 ESSENTIAL HYPERTENSION: ICD-10-CM

## 2023-11-08 RX ORDER — AMLODIPINE BESYLATE 10 MG/1
10 TABLET ORAL DAILY
Qty: 90 TABLET | Refills: 1 | Status: SHIPPED | OUTPATIENT
Start: 2023-11-08

## 2023-11-08 NOTE — TELEPHONE ENCOUNTER
Comments: no appt sched pt is to return in 1 year    Last Office Visit (last PCP visit):   10/10/2023    Next Visit Date:  Future Appointments   Date Time Provider [address]   11/13/2023  2:00 PM Nj Sexton [place], [address] [place]   12/2/2023  8:30 AM Luis Szymanski, [address] [address]       **If hasn't been seen in over a year OR hasn't followed up according to last diabetes/ADHD visit, make appointment for patient before sending refill to provider.     Rx requested:  Requested Prescriptions     Pending Prescriptions Disp Refills    amLODIPine (NORVASC) 10 MG tablet [Pharmacy Med Name: AMLODIPINE TAB 10MG] 90 tablet 1     Sig: TAKE 1 TABLET DAILY

## 2023-11-10 ENCOUNTER — OFFICE VISIT (OUTPATIENT)
Dept: FAMILY MEDICINE CLINIC | Age: 53
End: 2023-11-10
Payer: COMMERCIAL

## 2023-11-10 VITALS
OXYGEN SATURATION: 96 % | HEART RATE: 118 BPM | DIASTOLIC BLOOD PRESSURE: 84 MMHG | WEIGHT: 293 LBS | BODY MASS INDEX: 43.4 KG/M2 | HEIGHT: 69 IN | SYSTOLIC BLOOD PRESSURE: 118 MMHG

## 2023-11-10 DIAGNOSIS — M25.50 POLYARTHRALGIA: ICD-10-CM

## 2023-11-10 DIAGNOSIS — M79.10 MYALGIA: ICD-10-CM

## 2023-11-10 DIAGNOSIS — M25.50 POLYARTHRALGIA: Primary | ICD-10-CM

## 2023-11-10 LAB
ALBUMIN SERPL-MCNC: 4.1 G/DL (ref 3.5–4.6)
ALP SERPL-CCNC: 96 U/L (ref 40–130)
ALT SERPL-CCNC: 31 U/L (ref 0–33)
ANION GAP SERPL CALCULATED.3IONS-SCNC: 12 MEQ/L (ref 9–15)
AST SERPL-CCNC: 35 U/L (ref 0–35)
BILIRUB SERPL-MCNC: 0.5 MG/DL (ref 0.2–0.7)
BUN SERPL-MCNC: 16 MG/DL (ref 6–20)
CALCIUM SERPL-MCNC: 10 MG/DL (ref 8.5–9.9)
CHLORIDE SERPL-SCNC: 104 MEQ/L (ref 95–107)
CO2 SERPL-SCNC: 24 MEQ/L (ref 20–31)
CREAT SERPL-MCNC: 0.92 MG/DL (ref 0.5–0.9)
CRP SERPL HS-MCNC: 35.8 MG/L (ref 0–5)
ERYTHROCYTE [DISTWIDTH] IN BLOOD BY AUTOMATED COUNT: 13.4 % (ref 11.5–14.5)
ERYTHROCYTE [SEDIMENTATION RATE] IN BLOOD BY WESTERGREN METHOD: 35 MM (ref 0–30)
GLOBULIN SER CALC-MCNC: 3.5 G/DL (ref 2.3–3.5)
GLUCOSE SERPL-MCNC: 118 MG/DL (ref 70–99)
HCT VFR BLD AUTO: 48 % (ref 37–47)
HGB BLD-MCNC: 15.3 G/DL (ref 12–16)
MCH RBC QN AUTO: 29.3 PG (ref 27–31.3)
MCHC RBC AUTO-ENTMCNC: 31.9 % (ref 33–37)
MCV RBC AUTO: 91.8 FL (ref 79.4–94.8)
PLATELET # BLD AUTO: 335 K/UL (ref 130–400)
POTASSIUM SERPL-SCNC: 4.6 MEQ/L (ref 3.4–4.9)
PROT SERPL-MCNC: 7.6 G/DL (ref 6.3–8)
RBC # BLD AUTO: 5.23 M/UL (ref 4.2–5.4)
RHEUMATOID FACTOR: 20.7 IU/ML
SODIUM SERPL-SCNC: 140 MEQ/L (ref 135–144)
TSH REFLEX: 2.46 UIU/ML (ref 0.44–3.86)
WBC # BLD AUTO: 9.8 K/UL (ref 4.8–10.8)

## 2023-11-10 PROCEDURE — 99214 OFFICE O/P EST MOD 30 MIN: CPT | Performed by: NURSE PRACTITIONER

## 2023-11-10 PROCEDURE — 3079F DIAST BP 80-89 MM HG: CPT | Performed by: NURSE PRACTITIONER

## 2023-11-10 PROCEDURE — 3074F SYST BP LT 130 MM HG: CPT | Performed by: NURSE PRACTITIONER

## 2023-11-10 RX ORDER — METHYLPREDNISOLONE 4 MG/1
TABLET ORAL
Qty: 21 TABLET | Refills: 0 | Status: SHIPPED | OUTPATIENT
Start: 2023-11-10 | End: 2023-11-16

## 2023-11-10 ASSESSMENT — ENCOUNTER SYMPTOMS
COUGH: 0
EYE PAIN: 0
ABDOMINAL PAIN: 0
TROUBLE SWALLOWING: 0
CHEST TIGHTNESS: 0
CONSTIPATION: 0
COLOR CHANGE: 0
SHORTNESS OF BREATH: 0
DIARRHEA: 0
BACK PAIN: 0

## 2023-11-10 NOTE — PROGRESS NOTES
is not ill-appearing, toxic-appearing or diaphoretic. HENT:      Head: Normocephalic and atraumatic. Right Ear: External ear normal.      Left Ear: External ear normal.      Nose: Nose normal. No congestion or rhinorrhea. Eyes:      Extraocular Movements: Extraocular movements intact. Conjunctiva/sclera: Conjunctivae normal.      Pupils: Pupils are equal, round, and reactive to light. Cardiovascular:      Rate and Rhythm: Normal rate and regular rhythm. Pulses: Normal pulses. Heart sounds: Normal heart sounds. No murmur heard. Pulmonary:      Effort: Pulmonary effort is normal. No respiratory distress. Breath sounds: Normal breath sounds. No stridor. No wheezing, rhonchi or rales. Chest:      Chest wall: No tenderness. Musculoskeletal:         General: Normal range of motion. Cervical back: Normal range of motion and neck supple. No tenderness. Right lower leg: No edema. Left lower leg: No edema. Comments: Non pitting edema in hands and ankles, no erythema or warmth, full ROM   Lymphadenopathy:      Cervical: No cervical adenopathy. Skin:     General: Skin is warm. Capillary Refill: Capillary refill takes less than 2 seconds. Coloration: Skin is not jaundiced. Findings: No erythema or lesion. Neurological:      General: No focal deficit present. Mental Status: She is alert and oriented to person, place, and time. Mental status is at baseline. Cranial Nerves: No cranial nerve deficit. Coordination: Coordination normal.      Gait: Gait normal.   Psychiatric:         Mood and Affect: Mood normal.         Behavior: Behavior normal.         Thought Content: Thought content normal.         Judgment: Judgment normal.         Assessment& Plan     Diagnosis Orders   1.  Polyarthralgia  CBC    Comprehensive Metabolic Panel    Sedimentation Rate    C-Reactive Protein    LOUISE Screen With Reflex    West Nile Antibodies, IgG And IgM    TSH

## 2023-11-12 LAB — NUCLEAR IGG SER QL IA: NORMAL

## 2023-11-13 ENCOUNTER — HOSPITAL ENCOUNTER (OUTPATIENT)
Dept: PHYSICAL THERAPY | Age: 53
Setting detail: THERAPIES SERIES
Discharge: HOME | End: 2023-11-13
Payer: COMMERCIAL

## 2023-11-13 LAB
WNV IGG SER-ACNC: 0.57 IV
WNV IGM SER-ACNC: 0 IV

## 2023-11-13 PROCEDURE — 97110 THERAPEUTIC EXERCISES: CPT

## 2023-11-13 PROCEDURE — 97161 PT EVAL LOW COMPLEX 20 MIN: CPT

## 2023-11-13 ASSESSMENT — PAIN DESCRIPTION - ORIENTATION: ORIENTATION: RIGHT;POSTERIOR

## 2023-11-13 ASSESSMENT — PAIN SCALES - GENERAL: PAINLEVEL_OUTOF10: 0

## 2023-11-13 ASSESSMENT — PAIN DESCRIPTION - DESCRIPTORS: DESCRIPTORS: SHARP

## 2023-11-13 ASSESSMENT — PAIN DESCRIPTION - LOCATION: LOCATION: ANKLE

## 2023-11-13 NOTE — PLAN OF CARE
PHYSICAL THERAPY PLAN OF CARE   [facility]      [address], [address] [facility], [facility]     Ph: [phone] Fax: [phone]      [] Certification  [] Recertification [x]  Plan of Care  [] Progress Note [] Discharge      Referring Provider: Sean Matthews*      From:  aCrolina Ghosh PT   Patient: Radha Cunha (59 y.o. female) : 1970 Date: 2023   Medical Diagnosis: Chronic pain of right ankle [M25.571, G89.29] chronic pain of Rt ankle  Treatment Diagnosis: imbalance, decreased Rt ankle strength, decreased Rt ankle ROM    Plan of Care/Certification Expiration Date: :     Progress Report Period from:  2023  to 2023    Visits to Date: 1 No Show: 0 Cancelled Appts: 0    OBJECTIVE:   Short Term Goals - Time Frame for Short Term Goals: 2 wks    Goals Current/Discharge status  Status   Short Term Goal 1: Independent with HEP to promote home management of symptoms  Need for HEP and education  New   Short Term Goal 2: report 25% reduction in symptoms with increased ease with stepping down off curb  0/10 pain at rest, increased pain with stepping down curb  New     Long Term Goals - Time Frame for Long Term Goals : 3-4 wks  Goals Current/ Discharge status Status   Long Term Goal 1: Improve Rt ankle ROM WFL with improved ankle stability    AROM RLE (degrees)  R Ankle Dorsiflexion (0-20): 10 with knee flexion, 0 with knee extension seated, functional standing DF 10     AROM LLE (degrees)  L Ankle Dorsiflexion (0-20): 20    New   Long Term Goal 2: Improve Rt ankle strength 4+/5 to improve gait quality and stability Strength RLE  Strength RLE: WFL  Comment: 5/5  R Ankle Dorsiflexion: 5/5  R Ankle Plantar flexion: 3+/5 (3 heel raises SLS with 1 UE support)  R Ankle Inversion: 5/5  R Ankle Eversion: 5/5  Strength LLE  Strength LLE: WFL  Comment: 5/5  L Ankle Dorsiflexion: 5/5  L Ankle Plantar Flexion: 4/5 (5 SLS heel raises)  L Ankle Inversion: 5/5  L

## 2023-11-13 NOTE — PROGRESS NOTES
Increased pain    Statement of Medical Necessity: Physical Therapy is both indicated and medically necessary as outlined in the POC to increase the likelihood of meeting the functionally related goals stated below.      Patient's Activity Tolerance: Patient tolerated evaluation without incident      Patient's rehabilitation potential/prognosis is considered to be: Good    Factors which may impact rehabilitation potential include:       Patient Education: Goals, PT Role, Plan of Care, Evaluative findings, Home Exercise Program, Disease Specific Education      GOALS   Patient Goal(s): Patient Goals : strengthen legs    Short Term Goals Completed by 2 wks Goal Status   Independent with HEP to promote home management of symptoms New   report 25% reduction in symptoms with increased ease with stepping down off curb New     Long Term Goals Completed by 3-4 wks Goal Status   Improve Rt ankle ROM WFL with improved ankle stability New   Improve Rt ankle strength 4+/5 to improve gait quality and stability New   SLS 30 seconds bilaterally New   LEFS >/= 75/80 to demonstrate improved overall activity tolerance New        TREATMENT PLAN       Requires PT Follow-Up: Yes    Treatment may include any combination of the following: Strengthening, ROM, Functional mobility training, Manual, Patient/Caregiver education & training, Equipment evaluation, education, & procurement, Home exercise program, Modalities     Frequency / Duration:  Patient to be seen 2 times per week for 3-4 weeks        Eval Complexity:   Decision Making: Low Complexity  History: Personal Factors and/or Comorbidities Impacting POC: Low  History: personal factors: sedentary; contributing PMH: depression, HTN  Examination of body system(s) including body structures and functions, activity limitations, and/or participation restrictions: Medium  Exam: musculoskeletal, pain and sensory systems involved impacting strength, ROM, SLS, gait, curb steps; FTSTS: 9.3, Lt SLS

## 2023-11-16 ENCOUNTER — HOSPITAL ENCOUNTER (OUTPATIENT)
Dept: PHYSICAL THERAPY | Age: 53
Setting detail: THERAPIES SERIES
Discharge: HOME | End: 2023-11-16
Payer: COMMERCIAL

## 2023-11-16 PROCEDURE — 97110 THERAPEUTIC EXERCISES: CPT

## 2023-11-17 ENCOUNTER — TELEPHONE (OUTPATIENT)
Dept: FAMILY MEDICINE CLINIC | Age: 53
End: 2023-11-17

## 2023-11-17 DIAGNOSIS — R76.8 RHEUMATOID FACTOR POSITIVE: ICD-10-CM

## 2023-11-17 DIAGNOSIS — R70.0 ELEVATED SED RATE: ICD-10-CM

## 2023-11-17 DIAGNOSIS — R79.82 ELEVATED C-REACTIVE PROTEIN (CRP): ICD-10-CM

## 2023-11-17 DIAGNOSIS — M25.50 POLYARTHRALGIA: Primary | ICD-10-CM

## 2023-11-20 ENCOUNTER — HOSPITAL ENCOUNTER (OUTPATIENT)
Dept: PHYSICAL THERAPY | Age: 53
Setting detail: THERAPIES SERIES
Discharge: HOME | End: 2023-11-20
Payer: COMMERCIAL

## 2023-11-20 PROCEDURE — 97110 THERAPEUTIC EXERCISES: CPT

## 2023-11-20 ASSESSMENT — PAIN DESCRIPTION - ORIENTATION: ORIENTATION: RIGHT;LEFT

## 2023-11-20 ASSESSMENT — PAIN DESCRIPTION - DESCRIPTORS: DESCRIPTORS: THROBBING;SORE;ACHING

## 2023-11-20 ASSESSMENT — PAIN SCALES - GENERAL: PAINLEVEL_OUTOF10: 3

## 2023-11-20 ASSESSMENT — PAIN DESCRIPTION - LOCATION: LOCATION: ANKLE;WRIST

## 2023-11-20 NOTE — PROGRESS NOTES
[facility]  Outpatient Physical Therapy    Treatment Note        Date: 2023  Patient: Sylvia Patel  : 1970   Confirmed: Yes  MRN: [id number]  Referring Provider: GERTRUDE Perkins CNP    Medical Diagnosis: Chronic pain of right ankle [M25.571, G89.29]       Treatment Diagnosis: imbalance, decreased Rt ankle strength, decreased Rt ankle ROM    Visit Information:  Insurance: Payor: Bob Lemons / Plan: Bob Lemons / Product Type: *No Product type* /   PT Visit Information  Onset Date:  (1.5 years ago)  PT Insurance Information: Bob Lemons  Total # of Visits Approved: 60  Total # of Visits to Date: 3  No Show: 0  Canceled Appointment: 0  Progress Note Counter: 3/8    Subjective Information:  Subjective: I have swelling pain in my ankles and joints. HEP Compliance:  [x] Good [] Fair [] Poor [] Reports not doing due to:    Pain Screening  Patient Currently in Pain: Yes  Pain Level: 3  Pain Location: Ankle, Wrist  Pain Orientation: Right, Left  Pain Descriptors: Throbbing, Sore, Aching    Treatment:  Exercises:  Exercises  Exercise 1: ankle AROM: AP, inversion/ eversion, circles x10 ea  Exercise 2: Heel raise on stair with gastroc stretch x 10  with 10\" stretch between reps  Exercise 3: SLS on foam bilw/ intermittent finger touch support 3x30\"  Exercise 4: foam standing w/ FT and EC 3x30\"  Exercise 6: Eccentric lowering 4\" stair x 10  Exercise 7: ankle Tband w/ RTB x10 ea, PF w/ GTB  Exercise 8: gait drills: retro stepping, austin/toe walking  Exercise 9: HS stretch in longsitting 3x30\" Rt  Exercise 10: nu-step L4 x 5 mins  Exercise 11: rockerboard x 15 taps A/P  Exercise 20: HEP: ankle TB, SLS w/ support as needed, heel/toe walking  Treatment Reasoning  Limitations addressed: Mobility, Strength, Flexibility       *Indicates exercise, modality, or manual techniques to be initiated when appropriate    Objective Measures:        Assessment:    Body Structures, Functions, Activity

## 2023-11-21 ENCOUNTER — OFFICE VISIT (OUTPATIENT)
Dept: FAMILY MEDICINE CLINIC | Age: 53
End: 2023-11-21
Payer: COMMERCIAL

## 2023-11-21 VITALS
HEIGHT: 69 IN | SYSTOLIC BLOOD PRESSURE: 118 MMHG | BODY MASS INDEX: 42.65 KG/M2 | OXYGEN SATURATION: 96 % | WEIGHT: 288 LBS | HEART RATE: 105 BPM | DIASTOLIC BLOOD PRESSURE: 84 MMHG

## 2023-11-21 DIAGNOSIS — R70.0 ELEVATED SED RATE: ICD-10-CM

## 2023-11-21 DIAGNOSIS — R76.8 RHEUMATOID FACTOR POSITIVE: ICD-10-CM

## 2023-11-21 DIAGNOSIS — R79.82 ELEVATED C-REACTIVE PROTEIN (CRP): ICD-10-CM

## 2023-11-21 DIAGNOSIS — F32.81 PMDD (PREMENSTRUAL DYSPHORIC DISORDER): ICD-10-CM

## 2023-11-21 DIAGNOSIS — M25.50 POLYARTHRALGIA: Primary | ICD-10-CM

## 2023-11-21 PROCEDURE — 99214 OFFICE O/P EST MOD 30 MIN: CPT | Performed by: NURSE PRACTITIONER

## 2023-11-21 PROCEDURE — 3078F DIAST BP <80 MM HG: CPT | Performed by: NURSE PRACTITIONER

## 2023-11-21 PROCEDURE — 3074F SYST BP LT 130 MM HG: CPT | Performed by: NURSE PRACTITIONER

## 2023-11-21 RX ORDER — MELOXICAM 15 MG/1
15 TABLET ORAL DAILY PRN
Qty: 30 TABLET | Refills: 1 | Status: SHIPPED | OUTPATIENT
Start: 2023-11-21

## 2023-11-21 RX ORDER — PREDNISONE 10 MG/1
TABLET ORAL
Qty: 20 TABLET | Refills: 0 | Status: SHIPPED | OUTPATIENT
Start: 2023-11-21

## 2023-11-21 RX ORDER — IBUPROFEN 800 MG/1
800 TABLET ORAL EVERY 8 HOURS PRN
Qty: 200 TABLET | Refills: 1 | Status: CANCELLED | OUTPATIENT
Start: 2023-11-21

## 2023-11-21 NOTE — PROGRESS NOTES
medication prescribed today, as well as treatment plan/ rationale and result expectations have been discussed with the patient who expresses understanding and desires to proceed. Close follow up to evaluate treatment results and for coordination of care. I have reviewed the patient's medical history in detail and updated the computerized patient record. As always, patient is advised that if symptoms worsen in any way they will proceed to the nearest emergency room. No orders of the defined types were placed in this encounter. Orders Placed This Encounter   Medications    predniSONE (DELTASONE) 10 MG tablet     Sig: Take 3 tabs for 3 days, then 2 tabs for 3 days, then 1 tab for 3 days, then 1/2 tab for 3 days, then stop. Dispense:  20 tablet     Refill:  0    meloxicam (MOBIC) 15 MG tablet     Sig: Take 1 tablet by mouth daily as needed for Pain     Dispense:  30 tablet     Refill:  1       There are no discontinued medications. No follow-ups on file.     Michael Brown, APRN - CNP

## 2023-11-22 ENCOUNTER — HOSPITAL ENCOUNTER (OUTPATIENT)
Dept: PHYSICAL THERAPY | Age: 53
Setting detail: THERAPIES SERIES
Discharge: HOME | End: 2023-11-22
Payer: COMMERCIAL

## 2023-11-22 PROCEDURE — 97110 THERAPEUTIC EXERCISES: CPT

## 2023-11-22 ASSESSMENT — ENCOUNTER SYMPTOMS
ABDOMINAL PAIN: 0
SHORTNESS OF BREATH: 0
EYE PAIN: 0
COLOR CHANGE: 0
CHEST TIGHTNESS: 0
BACK PAIN: 0
DIARRHEA: 0
TROUBLE SWALLOWING: 0
COUGH: 0
CONSTIPATION: 0

## 2023-11-22 ASSESSMENT — PAIN SCALES - GENERAL: PAINLEVEL_OUTOF10: 1

## 2023-11-22 ASSESSMENT — PAIN DESCRIPTION - LOCATION: LOCATION: ANKLE;WRIST

## 2023-11-22 ASSESSMENT — PAIN DESCRIPTION - ORIENTATION: ORIENTATION: RIGHT;LEFT

## 2023-11-22 NOTE — PROGRESS NOTES
[facility]  Outpatient Physical Therapy    Treatment Note        Date: 2023  Patient: Jimenez Lopez  : 1970   Confirmed: Yes  MRN: [id number]  Referring Provider: Sofi Hernandez APR*   Secondary Referring Provider (If applicable):     Medical Diagnosis: Chronic pain of right ankle [M25.571, G89.29] chronic pain of Rt ankle  Treatment Diagnosis: imbalance, decreased Rt ankle strength, decreased Rt ankle ROM    Visit Information:  Insurance: Payor: Flaco Leosdheeraj / Plan: Flaco Coppersmith / Product Type: *No Product type* /   PT Visit Information  Onset Date:  (1.5 years ago)  PT Insurance Information: [employer]  Total # of Visits Approved: 60  Total # of Visits to Date: 4  No Show: 0  Canceled Appointment: 0  Progress Note Counter:     Subjective Information:  Subjective: Pt reports HEP compliance, reports decrease swelling. Reports difficulty to stand  on toes to reach cabinets, etc.  Requests pictures of exercises, forgot previous session  HEP Compliance:  [x] Good [] Fair [] Poor [] Reports not doing due to:    Pain Screening  Pain Assessment: 0-10  Pain Level: 1  Pain Location: Ankle, Wrist  Pain Orientation: Right, Left    Treatment:  Exercises:  Exercises  Exercise 1: ankle AROM: AP, inversion/ eversion, circles x10 ea; ABCs  Exercise 2: Heel raise on stair with gastroc stretch x 10  with 10\" stretch between reps  Exercise 7: ankle Tband w/ RTB x10  Exercise 8: gait drills: side steps with tband  Exercise 9: HS/gastroc stretch with strap 3x30\"  Exercise 10: nu-step L4 x 5 mins  Exercise 20: HEP: ankle AROM, ankle tband, ankle stretches - reissued per pt request     Assessment: Body Structures, Functions, Activity Limitations Requiring Skilled Therapeutic Intervention: Decreased functional mobility , Decreased ROM, Decreased strength, Increased pain  Assessment: Pt with overall good ariane to treatment.   Cont'd limitations with standing heel raises but anticipate improvement with

## 2023-11-27 ENCOUNTER — HOSPITAL ENCOUNTER (OUTPATIENT)
Dept: PHYSICAL THERAPY | Age: 53
Setting detail: THERAPIES SERIES
Discharge: HOME | End: 2023-11-27
Payer: COMMERCIAL

## 2023-11-27 PROCEDURE — 97110 THERAPEUTIC EXERCISES: CPT

## 2023-11-27 NOTE — PROGRESS NOTES
Treatment Minutes: 43 Minutes  Procedure Minutes: N/A   Timed Activity Minutes Units   Ther Ex 43 3     Electronically signed by Jannette Gómez PTA on 11/27/23 at 5:05 PM EST  Electronically signed by Issa Chavez PT on 11/28/23 at 9:02 PM EST

## 2023-11-29 ENCOUNTER — HOSPITAL ENCOUNTER (OUTPATIENT)
Dept: PHYSICAL THERAPY | Age: 53
Setting detail: THERAPIES SERIES
Discharge: HOME | End: 2023-11-29
Payer: COMMERCIAL

## 2023-11-29 DIAGNOSIS — R79.82 ELEVATED C-REACTIVE PROTEIN (CRP): ICD-10-CM

## 2023-11-29 DIAGNOSIS — R76.8 RHEUMATOID FACTOR POSITIVE: ICD-10-CM

## 2023-11-29 DIAGNOSIS — M25.50 POLYARTHRALGIA: Primary | ICD-10-CM

## 2023-11-29 DIAGNOSIS — R70.0 ELEVATED SED RATE: ICD-10-CM

## 2023-11-29 PROCEDURE — 97140 MANUAL THERAPY 1/> REGIONS: CPT

## 2023-11-29 PROCEDURE — 97110 THERAPEUTIC EXERCISES: CPT

## 2023-11-29 NOTE — PROGRESS NOTES
PHYSICAL THERAPY PLAN OF CARE   [facility]      [address], [address] [facility], [facility]     Ph: [phone] Fax: [phone]      [] Certification  [] Recertification []  Plan of Care  [] Progress Note [x] Discharge      Referring Provider: Renu Oakes*      From:  Sukhdev Yeboah,  PT  Patient: Marty Anthony (70 y.o. female) : 1970 Date: 2023   Medical Diagnosis: Chronic pain of right ankle [M25.571, G89.29]    Treatment Diagnosis: imbalance, decreased Rt ankle strength, decreased Rt ankle ROM    Progress Report Period from:  2023  to 2023    Visits to Date: 6 No Show: 0 Cancelled Appts: 0    OBJECTIVE:   OBJECTIVE:   Short Term Goals - Time Frame for Short Term Goals: 2 wks    Goals Current/Discharge status  Status   Short Term Goal 1: Independent with HEP to promote home management of symptoms  Indep/compliant w/ current; completes daily Met   Short Term Goal 2: report 25% reduction in symptoms with increased ease with stepping down off curb  STG 2 Current Status[de-identified] Pt reports 90% reduction in sx's, no difficulty w/ stepping down off curb   Met     Long Term Goals - Time Frame for Long Term Goals : 3-4 wks  Goals Current/ Discharge status Status   Long Term Goal 1: Improve Rt ankle ROM WFL with improved ankle stability   AROM RLE (degrees)  R Ankle Dorsiflexion (0-20): 18 with knee flexion, 12 with knee extension seated, functional standing DF 12    Met   Long Term Goal 2: Improve Rt ankle strength 4+/5 to improve gait quality and stability  Strength RLE  R Ankle Dorsiflexion: 5/5  R Ankle Plantar flexion: 5/5 (Pt completes x10 HR's w/ light UE support), =/> clearance from floor vs Lt LE   R Ankle Inversion: 5/5  R Ankle Eversion: 5/5   Met   Long Term Goal 3: SLS 30 seconds bilaterally LTG 3 Current Status[de-identified] Pt maintains unsupported SLS 15-20 sec on Rt LE; able to bal >30 sec w/ intermittent UE support    Partially met    Long Term
flexion, 12 with knee extension seated, functional standing DF 12      STG 2 Current Status[de-identified] Pt reports 90% reduction in sx's, no difficulty w/ stepping down off curb     LTG 3 Current Status[de-identified] Pt  LTG 4 Current Status[de-identified] 72/80; Pt verbalizes 95% functional capability    Assessment: Body Structures, Functions, Activity Limitations Requiring Skilled Therapeutic Intervention: Decreased functional mobility , Decreased ROM, Decreased strength, Increased pain  Assessment: Pt discharging from skilled PT today per pt request. Pt verbalizes current functioning to be 95% of PLOF. Pt now denies pain upon stepping down from curb w/ pt also demonstrating good eccentric control and ankle stability on descend in clinic. Pt reports ability to perform stairs reciprocally w/ less reliance on HR for balance. Rt ankle ROM and strength noted to be equal to Lt LE w/ exception on DF mobility still improving. Pt edu on self DF mobilization stretch on step for cont'd increase in mobility. Pt w/ good understanding of HEP which was progressed to include recent additions to SL activity. Treatment Diagnosis: imbalance, decreased Rt ankle strength, decreased Rt ankle ROM  Therapy Prognosis: Good    Post-Pain Assessment:       Pain Rating (0-10 pain scale):   0/10   Location and pain description same as pre-treatment unless indicated.    Action: [x] NA   [] Perform HEP  [] Meds as prescribed  [] Modalities as prescribed   [] Call Physician     GOALS   Patient Goal(s): Patient Goals : strengthen legs    Short Term Goals Completed by 2 wks Goal Status   STG 1 Independent with HEP to promote home management of symptoms Met   STG 2 report 25% reduction in symptoms with increased ease with stepping down off curb Met     Long Term Goals Completed by 3-4 wks Goal Status   LTG 1 Improve Rt ankle ROM WFL with improved ankle stability Met   LTG 2 Improve Rt ankle strength 4+/5 to improve gait quality and stability Met   LTG 3 SLS 30 seconds bilaterally

## 2023-12-06 ENCOUNTER — OFFICE VISIT (OUTPATIENT)
Dept: FAMILY MEDICINE CLINIC | Age: 53
End: 2023-12-06
Payer: COMMERCIAL

## 2023-12-06 VITALS
WEIGHT: 285 LBS | HEART RATE: 98 BPM | OXYGEN SATURATION: 98 % | HEIGHT: 69 IN | DIASTOLIC BLOOD PRESSURE: 64 MMHG | BODY MASS INDEX: 42.21 KG/M2 | SYSTOLIC BLOOD PRESSURE: 104 MMHG

## 2023-12-06 DIAGNOSIS — R00.0 TACHYCARDIA: ICD-10-CM

## 2023-12-06 DIAGNOSIS — R00.2 PALPITATIONS: ICD-10-CM

## 2023-12-06 DIAGNOSIS — I49.9 IRREGULAR HEART RATE: Primary | ICD-10-CM

## 2023-12-06 PROCEDURE — 3078F DIAST BP <80 MM HG: CPT | Performed by: NURSE PRACTITIONER

## 2023-12-06 PROCEDURE — 93000 ELECTROCARDIOGRAM COMPLETE: CPT | Performed by: NURSE PRACTITIONER

## 2023-12-06 PROCEDURE — 3074F SYST BP LT 130 MM HG: CPT | Performed by: NURSE PRACTITIONER

## 2023-12-06 PROCEDURE — 99214 OFFICE O/P EST MOD 30 MIN: CPT | Performed by: NURSE PRACTITIONER

## 2023-12-06 ASSESSMENT — ENCOUNTER SYMPTOMS
TROUBLE SWALLOWING: 0
ABDOMINAL PAIN: 0
CONSTIPATION: 0
EYE PAIN: 0
COLOR CHANGE: 0
CHEST TIGHTNESS: 0
COUGH: 0
BACK PAIN: 0
DIARRHEA: 0
SHORTNESS OF BREATH: 0

## 2023-12-06 NOTE — PROGRESS NOTES
Subjective  Chief Complaint   Patient presents with    Tachycardia     States that she is having issues with tachycardia       HPI    Pt here to discuss tachycardia. Had increased heart rate previously which had been well controlled by metoprolol. Recent polyarthralgia episode which also had episodes of tachycardia. These episodes worsened while on prednisone, had incident 3 days ago with heart rate up in the 140s along with palpitations. Pt has since finished prednisone and increased metoprolol to 100 mg XL twice daily. Symptoms have improved since that time. No palpitations since that time.      Past Medical History:   Diagnosis Date    Allergic rhinitis     Depression     Depression     GERD (gastroesophageal reflux disease)     Hypertention, malignant, with acute intensive management     PCOS (polycystic ovarian syndrome)      Patient Active Problem List    Diagnosis Date Noted    PCOS (polycystic ovarian syndrome)     GERD (gastroesophageal reflux disease)     Depression     Allergic rhinitis 2017    Essential hypertension 2013    PCOD (polycystic ovarian disease) 2013     Past Surgical History:   Procedure Laterality Date    COLONOSCOPY N/A 10/7/2019    COLONOSCOPY DIAGNOSTIC performed by Markus Hawthorne MD at [facility]  2019        UPPER GASTROINTESTINAL ENDOSCOPY N/A 10/7/2019    EGD ESOPHAGOGASTRODUODENOSCOPY performed by Markus Hawthorne MD at [facility]     Family History   Problem Relation Age of Onset    Diabetes Paternal Grandfather     Hypertension Father     Colon Cancer Father     Diabetes Father     High Blood Pressure Father     Alcohol Abuse Father          of cirrhosis and liver cancer    Vision Loss Father     Cancer Brother         kidney    High Blood Pressure Brother     Diabetes Brother     Kidney Disease Brother         Cancer    No Known Problems Paternal Grandmother     Vision Loss

## 2023-12-09 ENCOUNTER — OFFICE VISIT (OUTPATIENT)
Dept: OBGYN CLINIC | Age: 53
End: 2023-12-09
Payer: COMMERCIAL

## 2023-12-09 VITALS
HEART RATE: 87 BPM | DIASTOLIC BLOOD PRESSURE: 76 MMHG | BODY MASS INDEX: 41.79 KG/M2 | SYSTOLIC BLOOD PRESSURE: 126 MMHG | WEIGHT: 283 LBS

## 2023-12-09 DIAGNOSIS — Z90.722 HISTORY OF BILATERAL SALPINGO-OOPHORECTOMY (BSO): ICD-10-CM

## 2023-12-09 DIAGNOSIS — Z90.79 HISTORY OF BILATERAL SALPINGO-OOPHORECTOMY (BSO): ICD-10-CM

## 2023-12-09 DIAGNOSIS — Z12.31 SCREENING MAMMOGRAM FOR BREAST CANCER: ICD-10-CM

## 2023-12-09 DIAGNOSIS — Z01.419 WOMEN'S ANNUAL ROUTINE GYNECOLOGICAL EXAMINATION: Primary | ICD-10-CM

## 2023-12-09 DIAGNOSIS — Z11.51 SCREENING FOR HUMAN PAPILLOMAVIRUS: ICD-10-CM

## 2023-12-09 PROCEDURE — 3074F SYST BP LT 130 MM HG: CPT | Performed by: ADVANCED PRACTICE MIDWIFE

## 2023-12-09 PROCEDURE — 3078F DIAST BP <80 MM HG: CPT | Performed by: ADVANCED PRACTICE MIDWIFE

## 2023-12-09 PROCEDURE — 99386 PREV VISIT NEW AGE 40-64: CPT | Performed by: ADVANCED PRACTICE MIDWIFE

## 2023-12-09 ASSESSMENT — ENCOUNTER SYMPTOMS
SORE THROAT: 0
DIARRHEA: 0
TROUBLE SWALLOWING: 0
RHINORRHEA: 0
SHORTNESS OF BREATH: 0
VOICE CHANGE: 0
CONSTIPATION: 0
ABDOMINAL PAIN: 0
VOMITING: 0
NAUSEA: 0
COUGH: 0

## 2023-12-09 NOTE — PROGRESS NOTES
injury and foreign body. No vaginal discharge, erythema, tenderness, bleeding, lesions or prolapsed vaginal walls. Cervix: No cervical motion tenderness, discharge, friability, lesion, erythema, cervical bleeding or eversion. Uterus: Not deviated, not enlarged, not fixed, not tender and no uterine prolapse. Rectum: No mass or external hemorrhoid. Musculoskeletal:         General: Normal range of motion. Cervical back: Normal range of motion and neck supple. Right lower leg: No edema. Left lower leg: No edema. Lymphadenopathy:      Lower Body: No right inguinal adenopathy. No left inguinal adenopathy. Skin:     General: Skin is warm and dry. Capillary Refill: Capillary refill takes less than 2 seconds. Coloration: Skin is not jaundiced or pale. Neurological:      Mental Status: She is alert and oriented to person, place, and time. Mental status is at baseline. Motor: No weakness. Coordination: Coordination normal.      Gait: Gait normal.   Psychiatric:         Mood and Affect: Mood normal.         Behavior: Behavior normal.       Assessment:      Diagnosis Orders   1. Women's annual routine gynecological examination        2. Screening for human papillomavirus  PAP SMEAR      3. History of bilateral salpingo-oophorectomy (BSO)  DEXA BONE DENSITY AXIAL SKELETON      4. Screening mammogram for breast cancer  BLAYNE DIGITAL SCREEN W OR WO CAD BILATERAL        Plan:     Annual Exam, Screening for STD's  Pap - Collected  Screening for STD's - Declined    History of Bilateral BSO  Obtain baseline bone density scan    Screening for Breast Cancer  Mammogram referral given    Follow Up:  Return in about 1 year (around 12/9/2024) for Annual [facility] Woman Visit.     Orders Placed This Encounter   Procedures    DEXA BONE DENSITY AXIAL SKELETON     Standing Status:   Future     Standing Expiration Date:   12/9/2024    BLAYNE DIGITAL SCREEN W OR WO CAD BILATERAL     Standing Status:

## 2023-12-10 DIAGNOSIS — Z11.51 SCREENING FOR HUMAN PAPILLOMAVIRUS: ICD-10-CM

## 2023-12-14 LAB
HPV HR 12 DNA SPEC QL NAA+PROBE: NOT DETECTED
HPV16 DNA SPEC QL NAA+PROBE: NOT DETECTED
HPV16+18+H RISK 12 DNA SPEC-IMP: NORMAL
HPV18 DNA SPEC QL NAA+PROBE: NOT DETECTED

## 2023-12-15 ENCOUNTER — HOSPITAL ENCOUNTER (OUTPATIENT)
Age: 53
Discharge: HOME | End: 2023-12-15
Payer: COMMERCIAL

## 2023-12-15 DIAGNOSIS — I49.9 IRREGULAR HEART RATE: ICD-10-CM

## 2023-12-15 DIAGNOSIS — R00.0 TACHYCARDIA: ICD-10-CM

## 2023-12-15 DIAGNOSIS — R00.2 PALPITATIONS: ICD-10-CM

## 2023-12-15 PROCEDURE — 93225 XTRNL ECG REC<48 HRS REC: CPT

## 2024-01-16 ENCOUNTER — OFFICE VISIT (OUTPATIENT)
Dept: CARDIOLOGY CLINIC | Age: 54
End: 2024-01-16
Payer: COMMERCIAL

## 2024-01-16 VITALS
HEART RATE: 92 BPM | WEIGHT: 277.2 LBS | DIASTOLIC BLOOD PRESSURE: 78 MMHG | BODY MASS INDEX: 41.06 KG/M2 | HEIGHT: 69 IN | OXYGEN SATURATION: 97 % | SYSTOLIC BLOOD PRESSURE: 124 MMHG

## 2024-01-16 DIAGNOSIS — R00.2 PALPITATIONS: Primary | ICD-10-CM

## 2024-01-16 DIAGNOSIS — I10 HYPERTENSION, UNSPECIFIED TYPE: ICD-10-CM

## 2024-01-16 DIAGNOSIS — R00.0 TACHYCARDIA: ICD-10-CM

## 2024-01-16 PROCEDURE — 99204 OFFICE O/P NEW MOD 45 MIN: CPT | Performed by: INTERNAL MEDICINE

## 2024-01-16 PROCEDURE — 3078F DIAST BP <80 MM HG: CPT | Performed by: INTERNAL MEDICINE

## 2024-01-16 PROCEDURE — 3074F SYST BP LT 130 MM HG: CPT | Performed by: INTERNAL MEDICINE

## 2024-01-16 RX ORDER — HYDROXYCHLOROQUINE SULFATE 200 MG/1
TABLET, FILM COATED ORAL
COMMUNITY
Start: 2023-12-12 | End: 2024-01-16 | Stop reason: ALTCHOICE

## 2024-01-16 ASSESSMENT — ENCOUNTER SYMPTOMS
EYES NEGATIVE: 1
NAUSEA: 0
CHEST TIGHTNESS: 0
COUGH: 0
BLOOD IN STOOL: 0
RESPIRATORY NEGATIVE: 1
SHORTNESS OF BREATH: 0
STRIDOR: 0
GASTROINTESTINAL NEGATIVE: 1
WHEEZING: 0

## 2024-01-16 NOTE — PROGRESS NOTES
NEW PATIENT        Patient: Torie Espana  YOB: 1970  MRN: [id number]    Chief Complaint: palps tachycardia  Chief Complaint   Patient presents with    New Patient     Referred by Dinah Johnson CNP for irregular heart rate       CV Data:  121 HM negative    Subjective/HPI  OCT had a falre of Autoimuune d/o and had B/L hand and feet edema. She was placed on Prednisone.  She has had Palps and TAchycardia during that time. Now resolved. HM was negativ.e she ha sno cp no sob no palps otherwise. No falls no bleed.      EKG: SR 87.     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   + FH   Past Medical History:   Diagnosis Date    Allergic rhinitis     Depression     Depression     GERD (gastroesophageal reflux disease)     Hypertention, malignant, with acute intensive management     PCOS (polycystic ovarian syndrome)        Past Surgical History:   Procedure Laterality Date    COLONOSCOPY N/A 10/07/2019    COLONOSCOPY DIAGNOSTIC performed by Richi Roldan MD at [facility]    DILATION AND CURETTAGE      HYSTEROSCOPY      SALPINGO-OOPHORECTOMY Bilateral 2019        UPPER GASTROINTESTINAL ENDOSCOPY N/A 10/07/2019    EGD ESOPHAGOGASTRODUODENOSCOPY performed by Richi Roldan MD at [facility]       Family History   Problem Relation Age of Onset    Diabetes Paternal Grandfather     Hypertension Father     Colon Cancer Father     Diabetes Father     High Blood Pressure Father     Alcohol Abuse Father          of cirrhosis and liver cancer    Vision Loss Father     Cancer Brother         kidney    High Blood Pressure Brother     Diabetes Brother     Kidney Disease Brother         Cancer    No Known Problems Paternal Grandmother     Vision Loss Maternal Grandmother     Diabetes Maternal Grandfather     High Blood Pressure Mother     Other Mother         leg artery malfunction, causes swelling of the leg    Diabetes Mother     Heart Disease

## 2024-03-21 ENCOUNTER — HOSPITAL ENCOUNTER (OUTPATIENT)
Dept: WOMENS IMAGING | Age: 54
Discharge: HOME | End: 2024-03-23
Attending: ADVANCED PRACTICE MIDWIFE
Payer: COMMERCIAL

## 2024-03-21 ENCOUNTER — ANCILLARY ORDERS (OUTPATIENT)
Dept: OBGYN CLINIC | Age: 54
End: 2024-03-21

## 2024-03-21 DIAGNOSIS — Z12.31 SCREENING MAMMOGRAM FOR BREAST CANCER: ICD-10-CM

## 2024-03-21 DIAGNOSIS — Z90.722 HISTORY OF BILATERAL SALPINGO-OOPHORECTOMY (BSO): ICD-10-CM

## 2024-03-21 DIAGNOSIS — Z90.79 HISTORY OF BILATERAL SALPINGO-OOPHORECTOMY (BSO): ICD-10-CM

## 2024-03-21 DIAGNOSIS — Z11.51 SCREENING FOR HUMAN PAPILLOMAVIRUS: ICD-10-CM

## 2024-03-21 DIAGNOSIS — R92.2 INCONCLUSIVE MAMMOGRAM: ICD-10-CM

## 2024-03-21 DIAGNOSIS — Z01.419 WOMEN'S ANNUAL ROUTINE GYNECOLOGICAL EXAMINATION: Primary | ICD-10-CM

## 2024-03-21 PROCEDURE — 77080 DXA BONE DENSITY AXIAL: CPT

## 2024-03-21 PROCEDURE — 77063 BREAST TOMOSYNTHESIS BI: CPT

## 2024-03-26 ENCOUNTER — HOSPITAL ENCOUNTER (OUTPATIENT)
Dept: WOMENS IMAGING | Age: 54
Discharge: HOME | End: 2024-03-28
Payer: COMMERCIAL

## 2024-03-26 ENCOUNTER — APPOINTMENT (OUTPATIENT)
Dept: ULTRASOUND IMAGING | Age: 54
End: 2024-03-26
Attending: ADVANCED PRACTICE MIDWIFE
Payer: COMMERCIAL

## 2024-03-26 DIAGNOSIS — R92.2 INCONCLUSIVE MAMMOGRAM: ICD-10-CM

## 2024-03-26 PROCEDURE — 77065 DX MAMMO INCL CAD UNI: CPT

## 2024-06-03 ENCOUNTER — HOSPITAL ENCOUNTER (OUTPATIENT)
Age: 54
Discharge: HOME | End: 2024-06-05
Attending: INTERNAL MEDICINE
Payer: COMMERCIAL

## 2024-06-03 VITALS
SYSTOLIC BLOOD PRESSURE: 124 MMHG | DIASTOLIC BLOOD PRESSURE: 78 MMHG | BODY MASS INDEX: 41.03 KG/M2 | HEIGHT: 69 IN | WEIGHT: 277 LBS

## 2024-06-03 DIAGNOSIS — R00.2 PALPITATIONS: ICD-10-CM

## 2024-06-03 PROCEDURE — 93306 TTE W/DOPPLER COMPLETE: CPT

## 2024-06-05 LAB
ECHO AO ROOT DIAM: 3.2 CM
ECHO AO ROOT INDEX: 1.35 CM/M2
ECHO AR MAX VEL PISA: 4.4 M/S
ECHO AV AREA PEAK VELOCITY: 1.8 CM2
ECHO AV AREA PEAK VELOCITY: 1.8 CM2
ECHO AV AREA VTI: 1.8 CM2
ECHO AV AREA/BSA VTI: 0.8 CM2/M2
ECHO AV CUSP MM: 1.9 CM
ECHO AV MEAN GRADIENT: 6 MMHG
ECHO AV MEAN VELOCITY: 1.2 M/S
ECHO AV PEAK GRADIENT: 10 MMHG
ECHO AV PEAK GRADIENT: 11 MMHG
ECHO AV PEAK VELOCITY: 1.6 M/S
ECHO AV PEAK VELOCITY: 1.7 M/S
ECHO AV REGURGITANT PHT: 683.9 MILLISECOND
ECHO AV VTI: 36.1 CM
ECHO BSA: 2.47 M2
ECHO EST RA PRESSURE: 3 MMHG
ECHO LA DIAMETER INDEX: 1.52 CM/M2
ECHO LA DIAMETER: 3.6 CM
ECHO LA TO AORTIC ROOT RATIO: 1.13
ECHO LA VOL A-L A2C: 45 ML (ref 22–52)
ECHO LA VOL A-L A4C: 63 ML (ref 22–52)
ECHO LA VOL MOD A2C: 45 ML (ref 22–52)
ECHO LA VOL MOD A4C: 59 ML (ref 22–52)
ECHO LA VOLUME AREA LENGTH: 54 ML
ECHO LA VOLUME INDEX A-L A2C: 19 ML/M2 (ref 16–34)
ECHO LA VOLUME INDEX A-L A4C: 27 ML/M2 (ref 16–34)
ECHO LA VOLUME INDEX AREA LENGTH: 23 ML/M2 (ref 16–34)
ECHO LA VOLUME INDEX MOD A2C: 19 ML/M2 (ref 16–34)
ECHO LA VOLUME INDEX MOD A4C: 25 ML/M2 (ref 16–34)
ECHO LV E' LATERAL VELOCITY: 9 CM/S
ECHO LV E' SEPTAL VELOCITY: 7 CM/S
ECHO LV EDV A2C: 83 ML
ECHO LV EDV A4C: 95 ML
ECHO LV EDV BP: 88 ML (ref 56–104)
ECHO LV EDV INDEX A4C: 40 ML/M2
ECHO LV EDV INDEX BP: 37 ML/M2
ECHO LV EDV NDEX A2C: 35 ML/M2
ECHO LV EJECTION FRACTION A2C: 62 %
ECHO LV EJECTION FRACTION A4C: 55 %
ECHO LV EJECTION FRACTION BIPLANE: 55 % (ref 55–100)
ECHO LV ESV A2C: 31 ML
ECHO LV ESV A4C: 43 ML
ECHO LV ESV BP: 39 ML (ref 19–49)
ECHO LV ESV INDEX A2C: 13 ML/M2
ECHO LV ESV INDEX A4C: 18 ML/M2
ECHO LV ESV INDEX BP: 16 ML/M2
ECHO LV FRACTIONAL SHORTENING: 38 % (ref 28–44)
ECHO LV INTERNAL DIMENSION DIASTOLE INDEX: 2.32 CM/M2
ECHO LV INTERNAL DIMENSION DIASTOLIC: 5.5 CM (ref 3.9–5.3)
ECHO LV INTERNAL DIMENSION SYSTOLIC INDEX: 1.43 CM/M2
ECHO LV INTERNAL DIMENSION SYSTOLIC: 3.4 CM
ECHO LV IVSD: 1.1 CM (ref 0.6–0.9)
ECHO LV IVSS: 1.4 CM
ECHO LV MASS 2D: 213.2 G (ref 67–162)
ECHO LV MASS INDEX 2D: 89.9 G/M2 (ref 43–95)
ECHO LV POSTERIOR WALL DIASTOLIC: 0.9 CM (ref 0.6–0.9)
ECHO LV POSTERIOR WALL SYSTOLIC: 1.5 CM
ECHO LV RELATIVE WALL THICKNESS RATIO: 0.33
ECHO LVOT AREA: 2.8 CM2
ECHO LVOT AV VTI INDEX: 0.67
ECHO LVOT DIAM: 1.9 CM
ECHO LVOT MEAN GRADIENT: 3 MMHG
ECHO LVOT PEAK GRADIENT: 4 MMHG
ECHO LVOT PEAK VELOCITY: 1.1 M/S
ECHO LVOT STROKE VOLUME INDEX: 28.8 ML/M2
ECHO LVOT SV: 68.3 ML
ECHO LVOT VTI: 24.1 CM
ECHO MV A VELOCITY: 0.76 M/S
ECHO MV E DECELERATION TIME (DT): 241.4 MS
ECHO MV E VELOCITY: 0.5 M/S
ECHO MV E/A RATIO: 0.66
ECHO MV E/E' LATERAL: 5.56
ECHO MV E/E' RATIO (AVERAGED): 6.35
ECHO MV E/E' SEPTAL: 7.14
ECHO PULMONARY ARTERY END DIASTOLIC PRESSURE: 5 MMHG
ECHO PV MAX VELOCITY: 1 M/S
ECHO PV PEAK GRADIENT: 4 MMHG
ECHO PV REGURGITANT MAX VELOCITY: 1.1 M/S
ECHO RIGHT VENTRICULAR SYSTOLIC PRESSURE (RVSP): 12 MMHG
ECHO RV TAPSE: 2.2 CM (ref 1.7–?)
ECHO TV REGURGITANT MAX VELOCITY: 1.48 M/S
ECHO TV REGURGITANT PEAK GRADIENT: 9 MMHG

## 2024-06-05 PROCEDURE — 93306 TTE W/DOPPLER COMPLETE: CPT | Performed by: INTERNAL MEDICINE

## 2024-07-23 ENCOUNTER — OFFICE VISIT (OUTPATIENT)
Dept: FAMILY MEDICINE CLINIC | Age: 54
End: 2024-07-23
Payer: COMMERCIAL

## 2024-07-23 VITALS
HEIGHT: 69 IN | HEART RATE: 84 BPM | BODY MASS INDEX: 38.51 KG/M2 | WEIGHT: 260 LBS | OXYGEN SATURATION: 94 % | SYSTOLIC BLOOD PRESSURE: 130 MMHG | TEMPERATURE: 97.9 F | DIASTOLIC BLOOD PRESSURE: 82 MMHG

## 2024-07-23 DIAGNOSIS — H66.003 NON-RECURRENT ACUTE SUPPURATIVE OTITIS MEDIA OF BOTH EARS WITHOUT SPONTANEOUS RUPTURE OF TYMPANIC MEMBRANES: Primary | ICD-10-CM

## 2024-07-23 PROCEDURE — 3075F SYST BP GE 130 - 139MM HG: CPT | Performed by: FAMILY MEDICINE

## 2024-07-23 PROCEDURE — 99213 OFFICE O/P EST LOW 20 MIN: CPT | Performed by: FAMILY MEDICINE

## 2024-07-23 PROCEDURE — 3079F DIAST BP 80-89 MM HG: CPT | Performed by: FAMILY MEDICINE

## 2024-07-23 RX ORDER — AMOXICILLIN AND CLAVULANATE POTASSIUM 875; 125 MG/1; MG/1
1 TABLET, FILM COATED ORAL 2 TIMES DAILY
Qty: 20 TABLET | Refills: 0 | Status: SHIPPED | OUTPATIENT
Start: 2024-07-23 | End: 2024-08-02

## 2024-07-23 RX ORDER — IPRATROPIUM BROMIDE 21 UG/1
SPRAY, METERED NASAL
COMMUNITY
Start: 2024-06-29

## 2024-07-23 ASSESSMENT — ENCOUNTER SYMPTOMS
EYE REDNESS: 0
ABDOMINAL PAIN: 0
SHORTNESS OF BREATH: 0
VOMITING: 0
COUGH: 0
NAUSEA: 0
RHINORRHEA: 0
SORE THROAT: 0
EYE DISCHARGE: 0
DIARRHEA: 0

## 2024-07-23 NOTE — PROGRESS NOTES
[facility] SPECIALTY/PRIMARY CARE  [address], [address]  [facility] [address]  Dept: [phone]  Dept Fax: [phone]  Loc: [phone]     7/23/2024    Visit type: Follow up    Reason for Visit: Otalgia (Pt c/o bilateral otalgia, tinnitus, muffled sound.  Sx x2 mos. Denies discharged. )         Patient: Torie Espana is a 53 y.o. female     HPI: 53-year-old female presents with bilateral ear pain, ringing in the ears, muffled sounds.  Patient states this has been ongoing for 2 months and worsening.    ASSESSMENT/PLAN   Non-recurrent acute suppurative otitis media of both ears without spontaneous rupture of tympanic membranes  -     amoxicillin-clavulanate (AUGMENTIN) 875-125 MG per tablet; Take 1 tablet by mouth 2 times daily for 10 days, Disp-20 tablet, R-0Normal     -Continue with tinnitus bilateral ears, intermittent pulsatile nature from time to time though generally continuous. Does report muffling.  No neurological abnormalities.  Bulging tympanic membrane bilaterally.    -Adverse effects of medications prescribed were discussed, patient acknowledged understanding.  -Patient was advised to let me know if there is no improvement in the next 7 to 10 days. Discussed red flags warranting decision to go to the emergency department and patient understood.     -if no improvement in the next 10-14 days will refer to ENT    Orders Placed This Encounter   Medications    amoxicillin-clavulanate (AUGMENTIN) 875-125 MG per tablet     Sig: Take 1 tablet by mouth 2 times daily for 10 days     Dispense:  20 tablet     Refill:  0        Subjective      Review of Systems   Constitutional:  Negative for activity change, chills, fatigue and fever.   HENT:  Positive for ear pain and tinnitus. Negative for congestion, ear discharge, rhinorrhea and sore throat.    Eyes:  Negative for discharge and redness.   Respiratory:  Negative for cough and shortness of breath.

## 2024-07-29 ENCOUNTER — PATIENT MESSAGE (OUTPATIENT)
Dept: FAMILY MEDICINE CLINIC | Age: 54
End: 2024-07-29

## 2024-07-29 DIAGNOSIS — H93.13 TINNITUS OF BOTH EARS: Primary | ICD-10-CM

## 2024-07-29 DIAGNOSIS — H66.003 NON-RECURRENT ACUTE SUPPURATIVE OTITIS MEDIA OF BOTH EARS WITHOUT SPONTANEOUS RUPTURE OF TYMPANIC MEMBRANES: Primary | ICD-10-CM

## 2024-07-29 DIAGNOSIS — H91.93 DECREASED HEARING OF BOTH EARS: ICD-10-CM

## 2024-07-29 NOTE — TELEPHONE ENCOUNTER
From: Torie Espana  To: Dr. Guillermo Fernandez  Sent: 7/29/2024 8:28 AM EDT  Subject: Ear Visit    Hello Dr Fernandez, I have a few days left of Augmentin and my ear condition improved slightly but is not improving any longer and is not back to normal. I would recommend going forward with the ENT referral, if you approve?  Thank You!

## 2024-08-03 DIAGNOSIS — I10 ESSENTIAL HYPERTENSION: ICD-10-CM

## 2024-08-05 RX ORDER — AMLODIPINE BESYLATE 10 MG/1
10 TABLET ORAL DAILY
Qty: 90 TABLET | Refills: 1 | Status: SHIPPED | OUTPATIENT
Start: 2024-08-05

## 2024-08-05 NOTE — TELEPHONE ENCOUNTER
Comments: Called pt, she states she will sched an appt thru MyChart.  Pls approve or deny, thanks!    Last Office Visit (last PCP visit):   12/6/2023    Next Visit Date:  No new appt.         **If hasn't been seen in over a year OR hasn't followed up according to last diabetes/ADHD visit, make appointment for patient before sending refill to provider.    Rx requested:  Requested Prescriptions     Pending Prescriptions Disp Refills    amLODIPine (NORVASC) 10 MG tablet [Pharmacy Med Name: AMLODIPINE TAB 10MG] 90 tablet 1     Sig: TAKE 1 TABLET DAILY

## 2024-08-09 ENCOUNTER — OFFICE VISIT (OUTPATIENT)
Age: 54
End: 2024-08-09
Payer: COMMERCIAL

## 2024-08-09 VITALS
WEIGHT: 260 LBS | TEMPERATURE: 96.9 F | DIASTOLIC BLOOD PRESSURE: 82 MMHG | OXYGEN SATURATION: 96 % | HEIGHT: 69 IN | BODY MASS INDEX: 38.51 KG/M2 | SYSTOLIC BLOOD PRESSURE: 130 MMHG | RESPIRATION RATE: 16 BRPM | HEART RATE: 97 BPM

## 2024-08-09 DIAGNOSIS — J30.2 SEASONAL ALLERGIES: ICD-10-CM

## 2024-08-09 DIAGNOSIS — H91.92 HEARING LOSS OF LEFT EAR, UNSPECIFIED HEARING LOSS TYPE: Primary | ICD-10-CM

## 2024-08-09 DIAGNOSIS — J34.89 RHINORRHEA: ICD-10-CM

## 2024-08-09 DIAGNOSIS — H93.12 TINNITUS OF LEFT EAR: ICD-10-CM

## 2024-08-09 PROCEDURE — 99203 OFFICE O/P NEW LOW 30 MIN: CPT | Performed by: STUDENT IN AN ORGANIZED HEALTH CARE EDUCATION/TRAINING PROGRAM

## 2024-08-09 PROCEDURE — 3075F SYST BP GE 130 - 139MM HG: CPT | Performed by: STUDENT IN AN ORGANIZED HEALTH CARE EDUCATION/TRAINING PROGRAM

## 2024-08-09 PROCEDURE — 3079F DIAST BP 80-89 MM HG: CPT | Performed by: STUDENT IN AN ORGANIZED HEALTH CARE EDUCATION/TRAINING PROGRAM

## 2024-08-09 RX ORDER — AZELASTINE 1 MG/ML
1 SPRAY, METERED NASAL 2 TIMES DAILY
Qty: 30 ML | Refills: 0 | Status: SHIPPED | OUTPATIENT
Start: 2024-08-09

## 2024-08-09 NOTE — PROGRESS NOTES
[facility] PHYSICIANS [place] SPECIALTY CARE, [facility] OTOLARYNGOLOGY  [address] [place] [address], [address]  [facility] [address]  Dept: [phone]  Dept Fax: [phone]  Loc: [phone]     2024    Visit type: New patient    Reason for Visit: New Patient (Decreased hearing of both ears Tinnitus of both ears/)       ASSESSMENT/PLAN   1. Hearing loss of left ear, unspecified hearing loss type  -     Amb External Referral To Audiology  2. Tinnitus of left ear  3. Seasonal allergies  4. Rhinorrhea    eval ear with audiogram - further testing pending these results     Will add azelastine to nasal regimen for suspected allergic rhinitis     No follow-ups on file.  Orders Placed This Encounter   Medications    azelastine (ASTELIN) 0.1 % nasal spray     Si spray by Nasal route 2 times daily Use in each nostril as directed     Dispense:  30 mL     Refill:  0      Subjective    Patient: Torie Espana is a 53 y.o. female   HPI:    Referred by: Guillermo Fernandez MD  I have personally reviewed the note by the referring provider   Treated with Augmenitn 24    Presents today   At baseline has issues with head congestion, left ear ringing/decreased hearing   In May, was treated with amoxicillin, ipratropium, singulair for head cold     In the last 5 yrs, feels like allergy symptoms are well controlled    Main Symptoms:  Patient has anterior nasal drainage.   Clear  Patient does not have  posterior nasal drainage.    Patient does not have nasal airway obstruction.   Patient does not have  facial pain.    Patient does not have  facial pressure.  occasional   Patient does not have decreased sense of smell.  Associated Symptoms:   Patient has  headaches.    Patient does not have throat clearing.    Patient does not have coughing.    Patient has dysphonia.   Patient has sneezing.   Patient does not have itchy eyes.   Patient does not have nasal bleeding.      Medications currently on for sinonasal

## 2024-10-08 ENCOUNTER — OFFICE VISIT (OUTPATIENT)
Dept: FAMILY MEDICINE CLINIC | Age: 54
End: 2024-10-08
Payer: COMMERCIAL

## 2024-10-08 VITALS
WEIGHT: 262 LBS | DIASTOLIC BLOOD PRESSURE: 82 MMHG | SYSTOLIC BLOOD PRESSURE: 106 MMHG | OXYGEN SATURATION: 97 % | HEART RATE: 95 BPM | HEIGHT: 66 IN | BODY MASS INDEX: 42.11 KG/M2

## 2024-10-08 DIAGNOSIS — Z13.220 LIPID SCREENING: ICD-10-CM

## 2024-10-08 DIAGNOSIS — Z12.11 COLON CANCER SCREENING: ICD-10-CM

## 2024-10-08 DIAGNOSIS — I10 ESSENTIAL HYPERTENSION: ICD-10-CM

## 2024-10-08 DIAGNOSIS — Z00.00 ENCOUNTER FOR PREVENTIVE HEALTH EXAMINATION: Primary | ICD-10-CM

## 2024-10-08 PROCEDURE — 99214 OFFICE O/P EST MOD 30 MIN: CPT | Performed by: NURSE PRACTITIONER

## 2024-10-08 PROCEDURE — 3079F DIAST BP 80-89 MM HG: CPT | Performed by: NURSE PRACTITIONER

## 2024-10-08 PROCEDURE — 3074F SYST BP LT 130 MM HG: CPT | Performed by: NURSE PRACTITIONER

## 2024-10-08 RX ORDER — METOPROLOL SUCCINATE 100 MG/1
100 TABLET, EXTENDED RELEASE ORAL DAILY
Qty: 90 TABLET | Refills: 3 | Status: SHIPPED | OUTPATIENT
Start: 2024-10-08

## 2024-10-08 ASSESSMENT — ENCOUNTER SYMPTOMS
CONSTIPATION: 0
COLOR CHANGE: 0
ABDOMINAL PAIN: 0
DIARRHEA: 0
SHORTNESS OF BREATH: 0
CHEST TIGHTNESS: 0
BACK PAIN: 0
COUGH: 0
EYE PAIN: 0
TROUBLE SWALLOWING: 0

## 2024-10-08 NOTE — PROGRESS NOTES
tablet     Refill:  3       Medications Discontinued During This Encounter   Medication Reason    Fluticasone Propionate (FLONASE NA) Therapy completed    ipratropium (ATROVENT) 0.03 % nasal spray Therapy completed    metoprolol succinate (TOPROL XL) 100 MG extended release tablet REORDER       Return in about 1 year (around 10/8/2025) for htn.    Dinah Johnson, APRN - CNP

## 2024-11-04 DIAGNOSIS — H91.92 HEARING LOSS OF LEFT EAR, UNSPECIFIED HEARING LOSS TYPE: Primary | ICD-10-CM

## 2024-11-05 ENCOUNTER — ANCILLARY ORDERS (OUTPATIENT)
Dept: OBGYN CLINIC | Age: 54
End: 2024-11-05

## 2024-11-05 ENCOUNTER — APPOINTMENT (OUTPATIENT)
Dept: ULTRASOUND IMAGING | Age: 54
End: 2024-11-05
Attending: ADVANCED PRACTICE MIDWIFE
Payer: COMMERCIAL

## 2024-11-05 ENCOUNTER — HOSPITAL ENCOUNTER (OUTPATIENT)
Dept: WOMENS IMAGING | Age: 54
Discharge: HOME | End: 2024-11-07
Attending: ADVANCED PRACTICE MIDWIFE
Payer: COMMERCIAL

## 2024-11-05 DIAGNOSIS — R92.1 CALCIFICATION OF RIGHT BREAST ON MAMMOGRAPHY: ICD-10-CM

## 2024-11-05 DIAGNOSIS — Z12.31 SCREENING MAMMOGRAM FOR BREAST CANCER: ICD-10-CM

## 2024-11-05 DIAGNOSIS — Z90.79 HISTORY OF BILATERAL SALPINGO-OOPHORECTOMY (BSO): ICD-10-CM

## 2024-11-05 DIAGNOSIS — Z11.51 SCREENING FOR HUMAN PAPILLOMAVIRUS: ICD-10-CM

## 2024-11-05 DIAGNOSIS — R92.2 INCONCLUSIVE MAMMOGRAM: ICD-10-CM

## 2024-11-05 DIAGNOSIS — Z90.722 HISTORY OF BILATERAL SALPINGO-OOPHORECTOMY (BSO): ICD-10-CM

## 2024-11-05 DIAGNOSIS — Z01.419 WOMEN'S ANNUAL ROUTINE GYNECOLOGICAL EXAMINATION: Primary | ICD-10-CM

## 2024-11-05 PROCEDURE — G0279 TOMOSYNTHESIS, MAMMO: HCPCS

## 2024-11-13 DIAGNOSIS — Z13.220 LIPID SCREENING: ICD-10-CM

## 2024-11-13 DIAGNOSIS — I10 ESSENTIAL HYPERTENSION: ICD-10-CM

## 2024-11-13 LAB
ALBUMIN SERPL-MCNC: 4.1 G/DL (ref 3.5–4.6)
ALP SERPL-CCNC: 84 U/L (ref 40–130)
ALT SERPL-CCNC: 16 U/L (ref 0–33)
ANION GAP SERPL CALCULATED.3IONS-SCNC: 10 MEQ/L (ref 9–15)
AST SERPL-CCNC: 20 U/L (ref 0–35)
BILIRUB SERPL-MCNC: 0.6 MG/DL (ref 0.2–0.7)
BUN SERPL-MCNC: 15 MG/DL (ref 6–20)
CALCIUM SERPL-MCNC: 9.8 MG/DL (ref 8.5–9.9)
CHLORIDE SERPL-SCNC: 101 MEQ/L (ref 95–107)
CHOLEST SERPL-MCNC: 211 MG/DL (ref 0–199)
CO2 SERPL-SCNC: 27 MEQ/L (ref 20–31)
CREAT SERPL-MCNC: 0.93 MG/DL (ref 0.5–0.9)
ERYTHROCYTE [DISTWIDTH] IN BLOOD BY AUTOMATED COUNT: 13.7 % (ref 11.5–14.5)
GLOBULIN SER CALC-MCNC: 3 G/DL (ref 2.3–3.5)
GLUCOSE SERPL-MCNC: 92 MG/DL (ref 70–99)
HCT VFR BLD AUTO: 45 % (ref 37–47)
HDLC SERPL-MCNC: 55 MG/DL (ref 40–59)
HGB BLD-MCNC: 15.1 G/DL (ref 12–16)
LDLC SERPL CALC-MCNC: 127 MG/DL (ref 0–129)
MCH RBC QN AUTO: 30.6 PG (ref 27–31.3)
MCHC RBC AUTO-ENTMCNC: 33.6 % (ref 33–37)
MCV RBC AUTO: 91.3 FL (ref 79.4–94.8)
PLATELET # BLD AUTO: 234 K/UL (ref 130–400)
POTASSIUM SERPL-SCNC: 5 MEQ/L (ref 3.4–4.9)
PROT SERPL-MCNC: 7.1 G/DL (ref 6.3–8)
RBC # BLD AUTO: 4.93 M/UL (ref 4.2–5.4)
SODIUM SERPL-SCNC: 138 MEQ/L (ref 135–144)
TRIGL SERPL-MCNC: 145 MG/DL (ref 0–150)
WBC # BLD AUTO: 7.2 K/UL (ref 4.8–10.8)

## 2025-01-24 DIAGNOSIS — I10 ESSENTIAL HYPERTENSION: ICD-10-CM

## 2025-01-24 RX ORDER — AMLODIPINE BESYLATE 10 MG/1
10 TABLET ORAL DAILY
Qty: 90 TABLET | Refills: 1 | Status: SHIPPED | OUTPATIENT
Start: 2025-01-24

## 2025-01-24 NOTE — TELEPHONE ENCOUNTER
Comments: LOV 10-8-24Rochelle message sent regarding appt    Last Office Visit (last PCP visit):   Visit date not found    Next Visit Date:  No future appointments.    **If hasn't been seen in over a year OR hasn't followed up according to last diabetes/ADHD visit, make appointment for patient before sending refill to provider.    Rx requested:  Requested Prescriptions     Pending Prescriptions Disp Refills    amLODIPine (NORVASC) 10 MG tablet [Pharmacy Med Name: AMLODIPINE TAB 10MG] 90 tablet 1     Sig: TAKE 1 TABLET DAILY

## 2025-02-06 DIAGNOSIS — F40.240 CLAUSTROPHOBIA: Primary | ICD-10-CM

## 2025-02-06 RX ORDER — DIAZEPAM 5 MG/1
TABLET ORAL
Qty: 1 TABLET | Refills: 0 | Status: SHIPPED | OUTPATIENT
Start: 2025-02-06 | End: 2025-02-13

## 2025-02-19 ENCOUNTER — HOSPITAL ENCOUNTER (OUTPATIENT)
Dept: MRI IMAGING | Age: 55
Discharge: HOME | End: 2025-02-21
Attending: STUDENT IN AN ORGANIZED HEALTH CARE EDUCATION/TRAINING PROGRAM
Payer: COMMERCIAL

## 2025-02-19 DIAGNOSIS — H91.92 HEARING LOSS OF LEFT EAR, UNSPECIFIED HEARING LOSS TYPE: ICD-10-CM

## 2025-02-19 PROCEDURE — 70553 MRI BRAIN STEM W/O & W/DYE: CPT

## 2025-02-19 PROCEDURE — 6360000004 HC RX CONTRAST MEDICATION: Performed by: STUDENT IN AN ORGANIZED HEALTH CARE EDUCATION/TRAINING PROGRAM

## 2025-02-19 PROCEDURE — A9579 GAD-BASE MR CONTRAST NOS,1ML: HCPCS | Performed by: STUDENT IN AN ORGANIZED HEALTH CARE EDUCATION/TRAINING PROGRAM

## 2025-02-19 RX ADMIN — GADOTERIDOL 20 ML: 279.3 INJECTION, SOLUTION INTRAVENOUS at 16:41

## 2025-04-23 ENCOUNTER — APPOINTMENT (OUTPATIENT)
Dept: ULTRASOUND IMAGING | Age: 55
End: 2025-04-23
Attending: ADVANCED PRACTICE MIDWIFE
Payer: COMMERCIAL

## 2025-04-23 ENCOUNTER — HOSPITAL ENCOUNTER (OUTPATIENT)
Dept: WOMENS IMAGING | Age: 55
Discharge: HOME | End: 2025-04-25
Attending: ADVANCED PRACTICE MIDWIFE
Payer: COMMERCIAL

## 2025-04-23 DIAGNOSIS — R92.1 CALCIFICATION OF RIGHT BREAST ON MAMMOGRAPHY: ICD-10-CM

## 2025-04-23 PROCEDURE — G0279 TOMOSYNTHESIS, MAMMO: HCPCS

## 2025-07-23 DIAGNOSIS — I10 ESSENTIAL HYPERTENSION: ICD-10-CM

## 2025-07-23 RX ORDER — AMLODIPINE BESYLATE 10 MG/1
10 TABLET ORAL DAILY
Qty: 90 TABLET | Refills: 0 | Status: SHIPPED | OUTPATIENT
Start: 2025-07-23

## 2025-07-23 NOTE — TELEPHONE ENCOUNTER
Comments: called she will schedule on my chart     Last Office Visit (last PCP visit):   10/8/2024    Next Visit Date:       **If hasn't been seen in over a year OR hasn't followed up according to last diabetes/ADHD visit, make appointment for patient before sending refill to provider.    Rx requested:  Requested Prescriptions     Pending Prescriptions Disp Refills    amLODIPine (NORVASC) 10 MG tablet [Pharmacy Med Name: AMLODIPINE TAB 10MG] 90 tablet 1     Sig: TAKE 1 TABLET DAILY